# Patient Record
Sex: FEMALE | Race: WHITE | NOT HISPANIC OR LATINO | Employment: UNEMPLOYED | URBAN - METROPOLITAN AREA
[De-identification: names, ages, dates, MRNs, and addresses within clinical notes are randomized per-mention and may not be internally consistent; named-entity substitution may affect disease eponyms.]

---

## 2017-02-07 ENCOUNTER — HOSPITAL ENCOUNTER (OUTPATIENT)
Dept: LAB | Facility: MEDICAL CENTER | Age: 67
End: 2017-02-07
Attending: NURSE PRACTITIONER
Payer: COMMERCIAL

## 2017-02-07 LAB
BASOPHILS # BLD AUTO: 0.04 K/UL (ref 0–0.12)
BASOPHILS NFR BLD AUTO: 0.8 % (ref 0–1.8)
EOSINOPHIL # BLD: 0.17 K/UL (ref 0–0.51)
EOSINOPHIL NFR BLD AUTO: 3.3 % (ref 0–6.9)
ERYTHROCYTE [DISTWIDTH] IN BLOOD BY AUTOMATED COUNT: 47.8 FL (ref 35.9–50)
HCT VFR BLD AUTO: 40.6 % (ref 37–47)
HGB BLD-MCNC: 13.2 G/DL (ref 12–16)
IMM GRANULOCYTES # BLD AUTO: 0.01 K/UL (ref 0–0.11)
IMM GRANULOCYTES NFR BLD AUTO: 0.2 % (ref 0–0.9)
LYMPHOCYTES # BLD: 2.1 K/UL (ref 1–4.8)
LYMPHOCYTES NFR BLD AUTO: 40.5 % (ref 22–41)
MCH RBC QN AUTO: 31.1 PG (ref 27–33)
MCHC RBC AUTO-ENTMCNC: 32.5 G/DL (ref 33.6–35)
MCV RBC AUTO: 95.5 FL (ref 81.4–97.8)
MONOCYTES # BLD: 0.5 K/UL (ref 0–0.85)
MONOCYTES NFR BLD AUTO: 9.7 % (ref 0–13.4)
NEUTROPHILS # BLD: 2.36 K/UL (ref 2–7.15)
NEUTROPHILS NFR BLD AUTO: 45.5 % (ref 44–72)
NRBC # BLD AUTO: 0 K/UL
NRBC BLD-RTO: 0 /100 WBC
PLATELET # BLD AUTO: 399 K/UL (ref 164–446)
PMV BLD AUTO: 9.4 FL (ref 9–12.9)
RBC # BLD AUTO: 4.25 M/UL (ref 4.2–5.4)
WBC # BLD AUTO: 5.2 K/UL (ref 4.8–10.8)

## 2017-02-07 PROCEDURE — 85025 COMPLETE CBC W/AUTO DIFF WBC: CPT

## 2017-02-07 PROCEDURE — 36415 COLL VENOUS BLD VENIPUNCTURE: CPT

## 2017-02-08 ENCOUNTER — HOSPITAL ENCOUNTER (OUTPATIENT)
Facility: MEDICAL CENTER | Age: 67
End: 2017-02-08
Attending: NURSE PRACTITIONER
Payer: COMMERCIAL

## 2017-02-08 PROCEDURE — 82272 OCCULT BLD FECES 1-3 TESTS: CPT

## 2017-02-08 PROCEDURE — 89055 LEUKOCYTE ASSESSMENT FECAL: CPT

## 2017-02-08 PROCEDURE — 87328 CRYPTOSPORIDIUM AG IA: CPT

## 2017-02-08 PROCEDURE — 87493 C DIFF AMPLIFIED PROBE: CPT

## 2017-02-08 PROCEDURE — 87046 STOOL CULTR AEROBIC BACT EA: CPT

## 2017-02-08 PROCEDURE — 87329 GIARDIA AG IA: CPT

## 2017-02-08 PROCEDURE — 87045 FECES CULTURE AEROBIC BACT: CPT

## 2017-02-09 ENCOUNTER — HOSPITAL ENCOUNTER (OUTPATIENT)
Dept: LAB | Facility: MEDICAL CENTER | Age: 67
End: 2017-02-09
Attending: NURSE PRACTITIONER
Payer: COMMERCIAL

## 2017-02-09 LAB
25(OH)D3 SERPL-MCNC: 61 NG/ML (ref 30–100)
ALBUMIN SERPL BCP-MCNC: 4.2 G/DL (ref 3.2–4.9)
ALBUMIN/GLOB SERPL: 1.4 G/DL
ALP SERPL-CCNC: 92 U/L (ref 30–99)
ALT SERPL-CCNC: 13 U/L (ref 2–50)
ANION GAP SERPL CALC-SCNC: 7 MMOL/L (ref 0–11.9)
AST SERPL-CCNC: 19 U/L (ref 12–45)
BASOPHILS # BLD AUTO: 0.04 K/UL (ref 0–0.12)
BASOPHILS NFR BLD AUTO: 1 % (ref 0–1.8)
BILIRUB SERPL-MCNC: 0.6 MG/DL (ref 0.1–1.5)
BUN SERPL-MCNC: 25 MG/DL (ref 8–22)
C DIFF DNA SPEC QL NAA+PROBE: NEGATIVE
C DIFF TOX GENS STL QL NAA+PROBE: NEGATIVE
CALCIUM SERPL-MCNC: 10 MG/DL (ref 8.5–10.5)
CANCER AG125 SERPL-ACNC: 18.2 U/ML (ref 0–35)
CHLORIDE SERPL-SCNC: 104 MMOL/L (ref 96–112)
CHOLEST SERPL-MCNC: 245 MG/DL (ref 100–199)
CO2 SERPL-SCNC: 26 MMOL/L (ref 20–33)
CREAT SERPL-MCNC: 1.07 MG/DL (ref 0.5–1.4)
CRP SERPL HS-MCNC: 1.8 MG/L (ref 0–7.5)
EOSINOPHIL # BLD: 0.2 K/UL (ref 0–0.51)
EOSINOPHIL NFR BLD AUTO: 4.8 % (ref 0–6.9)
ERYTHROCYTE [DISTWIDTH] IN BLOOD BY AUTOMATED COUNT: 47.8 FL (ref 35.9–50)
ERYTHROCYTE [SEDIMENTATION RATE] IN BLOOD BY WESTERGREN METHOD: 14 MM/HOUR (ref 0–30)
FOLATE SERPL-MCNC: >23.6 NG/ML
GLOBULIN SER CALC-MCNC: 3 G/DL (ref 1.9–3.5)
GLUCOSE SERPL-MCNC: 87 MG/DL (ref 65–99)
HCT VFR BLD AUTO: 44 % (ref 37–47)
HDLC SERPL-MCNC: 62 MG/DL
HEMOCCULT STL QL: NEGATIVE
HGB BLD-MCNC: 13.6 G/DL (ref 12–16)
IMM GRANULOCYTES # BLD AUTO: 0.01 K/UL (ref 0–0.11)
IMM GRANULOCYTES NFR BLD AUTO: 0.2 % (ref 0–0.9)
LDLC SERPL CALC-MCNC: 169 MG/DL
LYMPHOCYTES # BLD: 1.42 K/UL (ref 1–4.8)
LYMPHOCYTES NFR BLD AUTO: 34.1 % (ref 22–41)
MCH RBC QN AUTO: 29.8 PG (ref 27–33)
MCHC RBC AUTO-ENTMCNC: 30.9 G/DL (ref 33.6–35)
MCV RBC AUTO: 96.3 FL (ref 81.4–97.8)
MONOCYTES # BLD: 0.47 K/UL (ref 0–0.85)
MONOCYTES NFR BLD AUTO: 11.3 % (ref 0–13.4)
NEUTROPHILS # BLD: 2.02 K/UL (ref 2–7.15)
NEUTROPHILS NFR BLD AUTO: 48.6 % (ref 44–72)
NRBC # BLD AUTO: 0 K/UL
NRBC BLD-RTO: 0 /100 WBC
PLATELET # BLD AUTO: 408 K/UL (ref 164–446)
PMV BLD AUTO: 9.8 FL (ref 9–12.9)
POTASSIUM SERPL-SCNC: 4.4 MMOL/L (ref 3.6–5.5)
PROT SERPL-MCNC: 7.2 G/DL (ref 6–8.2)
RBC # BLD AUTO: 4.57 M/UL (ref 4.2–5.4)
RHEUMATOID FACT SERPL-ACNC: <10 IU/ML (ref 0–14)
SODIUM SERPL-SCNC: 137 MMOL/L (ref 135–145)
T3FREE SERPL-MCNC: 3.95 PG/ML (ref 2.4–4.2)
T4 FREE SERPL-MCNC: 1.15 NG/DL (ref 0.53–1.43)
TRIGL SERPL-MCNC: 70 MG/DL (ref 0–149)
TSH SERPL DL<=0.005 MIU/L-ACNC: 8.07 UIU/ML (ref 0.3–3.7)
URATE SERPL-MCNC: 4.4 MG/DL (ref 1.9–8.2)
VIT B12 SERPL-MCNC: 1190 PG/ML (ref 211–911)
WBC # BLD AUTO: 4.2 K/UL (ref 4.8–10.8)
WBC STL QL MICRO: NORMAL

## 2017-02-09 PROCEDURE — 86431 RHEUMATOID FACTOR QUANT: CPT

## 2017-02-09 PROCEDURE — 86141 C-REACTIVE PROTEIN HS: CPT

## 2017-02-09 PROCEDURE — 85025 COMPLETE CBC W/AUTO DIFF WBC: CPT

## 2017-02-09 PROCEDURE — 82784 ASSAY IGA/IGD/IGG/IGM EACH: CPT | Mod: 91

## 2017-02-09 PROCEDURE — 82785 ASSAY OF IGE: CPT

## 2017-02-09 PROCEDURE — 82306 VITAMIN D 25 HYDROXY: CPT

## 2017-02-09 PROCEDURE — 84443 ASSAY THYROID STIM HORMONE: CPT

## 2017-02-09 PROCEDURE — 86039 ANTINUCLEAR ANTIBODIES (ANA): CPT

## 2017-02-09 PROCEDURE — 84550 ASSAY OF BLOOD/URIC ACID: CPT

## 2017-02-09 PROCEDURE — 36415 COLL VENOUS BLD VENIPUNCTURE: CPT

## 2017-02-09 PROCEDURE — 82746 ASSAY OF FOLIC ACID SERUM: CPT

## 2017-02-09 PROCEDURE — 86038 ANTINUCLEAR ANTIBODIES: CPT

## 2017-02-09 PROCEDURE — 82784 ASSAY IGA/IGD/IGG/IGM EACH: CPT

## 2017-02-09 PROCEDURE — 80053 COMPREHEN METABOLIC PANEL: CPT

## 2017-02-09 PROCEDURE — 80061 LIPID PANEL: CPT

## 2017-02-09 PROCEDURE — 85652 RBC SED RATE AUTOMATED: CPT

## 2017-02-09 PROCEDURE — 84481 FREE ASSAY (FT-3): CPT

## 2017-02-09 PROCEDURE — 82607 VITAMIN B-12: CPT

## 2017-02-09 PROCEDURE — 84439 ASSAY OF FREE THYROXINE: CPT

## 2017-02-09 PROCEDURE — 86304 IMMUNOASSAY TUMOR CA 125: CPT

## 2017-02-10 LAB
G LAMBLIA+C PARVUM AG STL QL RAPID: NORMAL
IGA SERPL-MCNC: 235 MG/DL (ref 68–408)
IGG SERPL-MCNC: 943 MG/DL (ref 768–1632)
IGM SERPL-MCNC: 60 MG/DL (ref 35–263)
SIGNIFICANT IND 70042: NORMAL
SITE SITE: NORMAL
SOURCE SOURCE: NORMAL

## 2017-02-11 LAB
NUCLEAR IGG SER QL IA: DETECTED
NUCLEAR IGG TITR SER IF: ABNORMAL {TITER}

## 2017-02-12 LAB
BACTERIA STL CULT: NORMAL
SIGNIFICANT IND 70042: NORMAL
SITE SITE: NORMAL
SOURCE SOURCE: NORMAL

## 2017-02-13 LAB — IGE SERPL-ACNC: 24 KU/L

## 2017-04-20 ENCOUNTER — HOSPITAL ENCOUNTER (OUTPATIENT)
Dept: LAB | Facility: MEDICAL CENTER | Age: 67
End: 2017-04-20
Attending: NURSE PRACTITIONER
Payer: COMMERCIAL

## 2017-04-20 LAB
T3FREE SERPL-MCNC: 3.44 PG/ML (ref 2.4–4.2)
T4 FREE SERPL-MCNC: 1.27 NG/DL (ref 0.53–1.43)
TSH SERPL DL<=0.005 MIU/L-ACNC: 1.46 UIU/ML (ref 0.3–3.7)

## 2017-04-20 PROCEDURE — 84439 ASSAY OF FREE THYROXINE: CPT

## 2017-04-20 PROCEDURE — 84443 ASSAY THYROID STIM HORMONE: CPT

## 2017-04-20 PROCEDURE — 36415 COLL VENOUS BLD VENIPUNCTURE: CPT

## 2017-04-20 PROCEDURE — 84481 FREE ASSAY (FT-3): CPT

## 2017-05-22 ENCOUNTER — HOSPITAL ENCOUNTER (OUTPATIENT)
Dept: RADIOLOGY | Facility: MEDICAL CENTER | Age: 67
End: 2017-05-22
Attending: OBSTETRICS & GYNECOLOGY
Payer: COMMERCIAL

## 2017-05-22 DIAGNOSIS — Z13.9 SCREENING: ICD-10-CM

## 2017-05-22 DIAGNOSIS — R92.30 DENSE BREAST TISSUE: ICD-10-CM

## 2017-05-22 PROCEDURE — 76641 ULTRASOUND BREAST COMPLETE: CPT

## 2017-05-22 PROCEDURE — 77063 BREAST TOMOSYNTHESIS BI: CPT

## 2017-05-23 ENCOUNTER — HOSPITAL ENCOUNTER (OUTPATIENT)
Dept: RADIOLOGY | Facility: MEDICAL CENTER | Age: 67
End: 2017-05-23
Attending: OBSTETRICS & GYNECOLOGY
Payer: COMMERCIAL

## 2017-05-23 DIAGNOSIS — Z80.41 FAMILY HISTORY OF MALIGNANT NEOPLASM OF OVARY: ICD-10-CM

## 2017-05-23 PROCEDURE — 76830 TRANSVAGINAL US NON-OB: CPT

## 2017-06-15 ENCOUNTER — HOSPITAL ENCOUNTER (OUTPATIENT)
Dept: RADIOLOGY | Facility: MEDICAL CENTER | Age: 67
End: 2017-06-15
Attending: OBSTETRICS & GYNECOLOGY
Payer: COMMERCIAL

## 2017-06-15 DIAGNOSIS — R92.8 ABNORMAL MAMMOGRAM OF LEFT BREAST: ICD-10-CM

## 2017-06-15 PROCEDURE — 76642 ULTRASOUND BREAST LIMITED: CPT | Mod: LT

## 2017-06-15 PROCEDURE — G0279 TOMOSYNTHESIS, MAMMO: HCPCS | Mod: LT

## 2017-06-21 ENCOUNTER — TELEPHONE (OUTPATIENT)
Dept: RADIOLOGY | Facility: MEDICAL CENTER | Age: 67
End: 2017-06-21

## 2017-06-21 NOTE — TELEPHONE ENCOUNTER
Spoke w/ pt to adwoa hoffman on 6/22 @ 10:30 check in @ 10:00, reviewed prep, location, and answered all questions

## 2017-06-22 ENCOUNTER — HOSPITAL ENCOUNTER (OUTPATIENT)
Dept: RADIOLOGY | Facility: MEDICAL CENTER | Age: 67
End: 2017-06-22
Attending: OBSTETRICS & GYNECOLOGY
Payer: COMMERCIAL

## 2017-06-22 DIAGNOSIS — R92.8 ABNORMAL MAMMOGRAM OF LEFT BREAST: ICD-10-CM

## 2017-06-22 PROCEDURE — A4648 IMPLANTABLE TISSUE MARKER: HCPCS

## 2017-06-22 PROCEDURE — 88305 TISSUE EXAM BY PATHOLOGIST: CPT

## 2017-06-23 ENCOUNTER — TELEPHONE (OUTPATIENT)
Dept: RADIOLOGY | Facility: MEDICAL CENTER | Age: 67
End: 2017-06-23

## 2017-07-26 ENCOUNTER — HOSPITAL ENCOUNTER (OUTPATIENT)
Dept: RADIOLOGY | Facility: MEDICAL CENTER | Age: 67
End: 2017-07-26
Attending: PHYSICIAN ASSISTANT
Payer: COMMERCIAL

## 2017-07-26 DIAGNOSIS — R22.32 MASS OF FINGER OF LEFT HAND: ICD-10-CM

## 2017-07-26 PROCEDURE — 73218 MRI UPPER EXTREMITY W/O DYE: CPT | Mod: LT

## 2017-12-20 DIAGNOSIS — Z01.812 PRE-OPERATIVE LABORATORY EXAMINATION: ICD-10-CM

## 2017-12-20 DIAGNOSIS — Z01.811 PRE-OPERATIVE RESPIRATORY EXAMINATION: ICD-10-CM

## 2017-12-20 DIAGNOSIS — Z01.810 PRE-OPERATIVE CARDIOVASCULAR EXAMINATION: ICD-10-CM

## 2017-12-20 LAB
ANION GAP SERPL CALC-SCNC: 6 MMOL/L (ref 0–11.9)
BUN SERPL-MCNC: 26 MG/DL (ref 8–22)
CALCIUM SERPL-MCNC: 9.3 MG/DL (ref 8.5–10.5)
CHLORIDE SERPL-SCNC: 106 MMOL/L (ref 96–112)
CO2 SERPL-SCNC: 27 MMOL/L (ref 20–33)
CREAT SERPL-MCNC: 1.08 MG/DL (ref 0.5–1.4)
ERYTHROCYTE [DISTWIDTH] IN BLOOD BY AUTOMATED COUNT: 46.8 FL (ref 35.9–50)
GFR SERPL CREATININE-BSD FRML MDRD: 51 ML/MIN/1.73 M 2
GLUCOSE SERPL-MCNC: 92 MG/DL (ref 65–99)
HCT VFR BLD AUTO: 39.1 % (ref 37–47)
HGB BLD-MCNC: 12.8 G/DL (ref 12–16)
MCH RBC QN AUTO: 31.3 PG (ref 27–33)
MCHC RBC AUTO-ENTMCNC: 32.7 G/DL (ref 33.6–35)
MCV RBC AUTO: 95.6 FL (ref 81.4–97.8)
PLATELET # BLD AUTO: 335 K/UL (ref 164–446)
PMV BLD AUTO: 9.6 FL (ref 9–12.9)
POTASSIUM SERPL-SCNC: 4.4 MMOL/L (ref 3.6–5.5)
RBC # BLD AUTO: 4.09 M/UL (ref 4.2–5.4)
SODIUM SERPL-SCNC: 139 MMOL/L (ref 135–145)
WBC # BLD AUTO: 5 K/UL (ref 4.8–10.8)

## 2017-12-20 PROCEDURE — 85027 COMPLETE CBC AUTOMATED: CPT

## 2017-12-20 PROCEDURE — 93005 ELECTROCARDIOGRAM TRACING: CPT

## 2017-12-20 PROCEDURE — 93010 ELECTROCARDIOGRAM REPORT: CPT | Performed by: INTERNAL MEDICINE

## 2017-12-20 PROCEDURE — 36415 COLL VENOUS BLD VENIPUNCTURE: CPT

## 2017-12-20 PROCEDURE — 80048 BASIC METABOLIC PNL TOTAL CA: CPT

## 2017-12-20 RX ORDER — COVID-19 ANTIGEN TEST
440 KIT MISCELLANEOUS DAILY
COMMUNITY
End: 2019-04-12

## 2017-12-20 RX ORDER — LEVOTHYROXINE SODIUM 0.15 MG/1
150 TABLET ORAL
COMMUNITY
End: 2018-05-10

## 2017-12-21 LAB — EKG IMPRESSION: NORMAL

## 2017-12-22 ENCOUNTER — OFFICE VISIT (OUTPATIENT)
Dept: INTERNAL MEDICINE | Facility: MEDICAL CENTER | Age: 67
End: 2017-12-22
Payer: COMMERCIAL

## 2017-12-22 VITALS
HEIGHT: 67 IN | TEMPERATURE: 97.7 F | SYSTOLIC BLOOD PRESSURE: 110 MMHG | WEIGHT: 193 LBS | BODY MASS INDEX: 30.29 KG/M2 | DIASTOLIC BLOOD PRESSURE: 64 MMHG | OXYGEN SATURATION: 96 % | HEART RATE: 73 BPM

## 2017-12-22 DIAGNOSIS — F41.9 ANXIETY: ICD-10-CM

## 2017-12-22 DIAGNOSIS — R05.8 DRY COUGH: ICD-10-CM

## 2017-12-22 DIAGNOSIS — Z23 NEED FOR VACCINATION FOR STREP PNEUMONIAE: ICD-10-CM

## 2017-12-22 DIAGNOSIS — E03.9 HYPOTHYROIDISM, UNSPECIFIED TYPE: ICD-10-CM

## 2017-12-22 DIAGNOSIS — Z23 NEED FOR INFLUENZA VACCINATION: ICD-10-CM

## 2017-12-22 DIAGNOSIS — K21.9 GASTROESOPHAGEAL REFLUX DISEASE, ESOPHAGITIS PRESENCE NOT SPECIFIED: ICD-10-CM

## 2017-12-22 DIAGNOSIS — Z00.00 HEALTHCARE MAINTENANCE: ICD-10-CM

## 2017-12-22 DIAGNOSIS — F32.A DEPRESSION, UNSPECIFIED DEPRESSION TYPE: ICD-10-CM

## 2017-12-22 DIAGNOSIS — Z86.39 HISTORY OF HYPERLIPIDEMIA: ICD-10-CM

## 2017-12-22 DIAGNOSIS — J34.89 STUFFY AND RUNNY NOSE: ICD-10-CM

## 2017-12-22 PROCEDURE — 90662 IIV NO PRSV INCREASED AG IM: CPT | Performed by: INTERNAL MEDICINE

## 2017-12-22 PROCEDURE — 90471 IMMUNIZATION ADMIN: CPT | Performed by: INTERNAL MEDICINE

## 2017-12-22 PROCEDURE — 99204 OFFICE O/P NEW MOD 45 MIN: CPT | Mod: 25,GC | Performed by: INTERNAL MEDICINE

## 2017-12-22 PROCEDURE — 90732 PPSV23 VACC 2 YRS+ SUBQ/IM: CPT | Performed by: INTERNAL MEDICINE

## 2017-12-22 PROCEDURE — 90472 IMMUNIZATION ADMIN EACH ADD: CPT | Performed by: INTERNAL MEDICINE

## 2017-12-22 RX ORDER — CETIRIZINE HYDROCHLORIDE 10 MG/1
10 TABLET ORAL DAILY
Qty: 30 TAB | Refills: 3 | Status: SHIPPED | OUTPATIENT
Start: 2017-12-22 | End: 2018-12-27

## 2017-12-22 RX ORDER — THIAMINE HCL 100 MG
TABLET ORAL
COMMUNITY

## 2017-12-22 RX ORDER — BENZONATATE 100 MG/1
100 CAPSULE ORAL 3 TIMES DAILY PRN
Qty: 60 CAP | Refills: 0 | Status: ON HOLD | OUTPATIENT
Start: 2017-12-22 | End: 2017-12-29

## 2017-12-22 ASSESSMENT — PATIENT HEALTH QUESTIONNAIRE - PHQ9: CLINICAL INTERPRETATION OF PHQ2 SCORE: 0

## 2017-12-22 NOTE — PROGRESS NOTES
"Lang Estrella is a 67 y.o. female here for a non-provider visit for:   FLU    Reason for immunization: Annual Flu Vaccine  Immunization records indicate need for vaccine: Yes, confirmed with Epic  Minimum interval has been met for this vaccine: Yes  ABN completed: Not Indicated    Order and dose verified by: ION AGUILAR Dated  8/7/15 was given to patient: Yes  All IAC Questionnaire questions were answered \"No.\"    Patient tolerated injection and no adverse effects were observed or reported: Yes    Pt scheduled for next dose in series: Not Indicated    Lang Estrella is a 67 y.o. female here for a non-provider visit for:   PNEUMOVAX (PPSV23)    Reason for immunization: Overdue/Provider Recommended  Immunization records indicate need for vaccine: Yes, confirmed with Epic  Minimum interval has been met for this vaccine: Yes  ABN completed: Not Indicated    Order and dose verified by: ION AGUILAR Dated  4/24/15 was given to patient: Yes  All IAC Questionnaire questions were answered \"No.\"    Patient tolerated injection and no adverse effects were observed or reported: Yes    Pt scheduled for next dose in series: Not Indicated    "

## 2017-12-22 NOTE — PROGRESS NOTES
New Patient to Establish    Reason to establish: New patient to establish    CC: establish care, stuffy runny nose, dry cough, blood clots in nose    HPI:   68 y/o female with past medical history of depression, anxiety, GERD, xerophthalmia,  and hypothyroidism presents to establish care and complains of stuffy and runny nose and dry cough.   Patient has recent history of sinusitis for which she completed 2 courses of antibiotics for 20 days complete by November 22, augmentin followed by amoxicillin.   Patient complains of intermittient blood and blood clots mixed with nasal mucous, for the past 2 weeks. She denies having active nose bleeds or prolonged nasal bleeding.  She gives history of having sinus pressure and achy sensatioin along b/l temples and frontal sinus area. She also gets frequent headaches in occipital + frontal area severity of 5-7/10. She has been taking aleve and tylenol for the headache and it has been helping. She does have a h/o of TMJ and uses nightguards.   She also complains of dry cough for the past 4-6 weeks, since completion of her antibiotics.    She denies sore throat, fever, chills, but does have night sweats infrequently.     Per patient her right eye has been bothering her and has been red on and off. However, she has xerophthalmia for which uses Restasis.    She has history of seasonal allergies that are present during fall, winter, and spring.   She is currently not taking any medication for allergies.      Depression:  On sertraline for about 5 years  PHQ2 negative, mood is fine. She denies suicidal or homicidal ideation.     Anxiety:   she denies h/o panic attacks. She has been doing really well on her current medication, sertraline.     GERD-  Diagnosed about 5 years ago. EGD done 2 years ago showing no esophagitis or PUD. On PPI and ranitidine for a couple years.     Healthcare Maintenance discussion:  Cancer screening:  Colonoscopy was done 2 years ago, and patient told to  repeat in 10 years.   Personal history of breast cancer s/p Right partial mastectomy in 2001 with radiation. B/L breast Reconsctruction in 2009.   Mammogram done last summer showed something suspicisou in Left breast followed by needle biopsy which showed mainly scar tissue. Since then patient needs to go in every 6 months for mammogram.   Pap smear- last year, and everything was okay. Has been getting pap smears yearly for many years now.     Vaccination:  Flu vaccine- has not gotten one this year. Flu vaccine provided.  tdap- about 1-2 years ago  prevnar - done last year, pneumovax (23) provided.     Metabolic:  Patient is very physically active. She goes to the gym a couple times a week and walks around campus quite a bit. She teaches at [employer].   Eats healthy food with oatmeal and yogurt for breakfast with half a bagel. Lunch is sandwich or burrito. Dinner with salad and some type of meat. She does have a bit of sweet tooth.   Patient has history of hyperlipidemia, but did not tolerate statins due to calf and leg pain/aches.     Social:  PHq2 is negative. Patient is on sertraline for depression and anxiety.     Infectious:  She used to give blood frequently in the past. She is not interested in getting tested for HIV.   She would like to be tested for Hepatitis C.       Patient Active Problem List    Diagnosis Date Noted   • Depression 12/22/2017   • Anxiety 12/22/2017   • GERD (gastroesophageal reflux disease) 12/22/2017   • Hypothyroidism 12/22/2017   • History of hyperlipidemia 12/22/2017   • Acquired deflected nasal septum 01/15/2016   • Hypertrophy of both inferior nasal turbinates 01/15/2016       Past Medical History:   Diagnosis Date   • Arthritis     haNDS   • Breast cancer (CMS-HCC)    • Bronchitis    • Cancer (CMS-HCC) 2000    breast   • Cold 11/2017    sinus infection- on augmentin   • Disorder of thyroid    • Heart burn    • Hiatus hernia syndrome    • Indigestion    • Pneumonia    •  Psychiatric problem    • Snoring    • Urinary incontinence        Current Outpatient Prescriptions   Medication Sig Dispense Refill   • Magnesium 500 MG Tab Take  by mouth.     • cetirizine (ZYRTEC) 10 MG Tab Take 1 Tab by mouth every day. 30 Tab 3   • benzonatate (TESSALON) 100 MG Cap Take 1 Cap by mouth 3 times a day as needed for Cough. 60 Cap 0   • levothyroxine (SYNTHROID) 150 MCG Tab Take 150 mcg by mouth Every morning on an empty stomach.     • Naproxen Sodium (ALEVE) 220 MG Cap Take 440 mg by mouth every day.     • RANITIDINE HCL PO Take  by mouth as needed.     • liothyronine (CYTOMEL) 5 MCG Tab Take 10 mcg by mouth every day.     • sertraline (ZOLOFT) 25 MG tablet Take 25 mg by mouth every day.     • valacyclovir (VALTREX) 1 GM Tab Take 1,000 mg by mouth as needed (rash).     • Dexlansoprazole 60 MG CAPSULE DELAYED RELEASE delayed-release capsule Take 60 mg by mouth every day.     • Thiamine HCl (VITAMIN B-1 PO) Take 1 Tab by mouth every day.     • VITAMIN D, CHOLECALCIFEROL, PO Take 1 Tab by mouth every day.     • Misc Natural Products (JOINT SUPPORT PO) Take 2 Tabs by mouth 2 Times a Day.     • Calcium-Magnesium-Vitamin D (CALCIUM 500 PO) Take 1 Tab by mouth 2 Times a Day.     • VITAMIN E PO Take 1 Tab by mouth every day.     • aspirin (ASA) 81 MG Chew Tab chewable tablet Take 81 mg by mouth. Every other day       No current facility-administered medications for this visit.        Allergies as of 12/22/2017 - Reviewed 12/22/2017   Allergen Reaction Noted   • Statins [hmg-coa-r inhibitors]  12/22/2017       Social History     Social History   • Marital status:      Spouse name: N/A   • Number of children: N/A   • Years of education: N/A     Occupational History   • Not on file.     Social History Main Topics   • Smoking status: Never Smoker   • Smokeless tobacco: Never Used   • Alcohol use 3.0 oz/week     5 Glasses of wine per week      Comment: 5/week   • Drug use: No   • Sexual activity: Not on  "file     Other Topics Concern   • Not on file     Social History Narrative   • No narrative on file       Family History   Problem Relation Age of Onset   • Breast Cancer Mother    • Stroke Mother    • Cancer Father      brain- most likely mets   • Heart Disease Father    • Cancer Sister 55     ovarian cancer - BRCA checked for her and was negative   • Diabetes Brother    • Diabetes Maternal Grandfather    • Cancer Sister 59     brain and breast cancer       Past Surgical History:   Procedure Laterality Date   • SEPTOPLASTY N/A 1/15/2016    Procedure: SEPTOPLASTY;  Surgeon: Randolph Kinney M.D.;  Location: [facility];  Service:    • TURBINOPLASTY Bilateral 1/15/2016    Procedure: TURBINOPLASTY;  Surgeon: Randolph Kinney M.D.;  Location: [facility];  Service:    • MASTECTOMY  2001   • LUMPECTOMY     • OTHER      Breast cancer and breast implants   • OTHER      sinus surgery   • OTHER ORTHOPEDIC SURGERY      bunions alethea   • PB RADIATION THERAPY PLAN SIMPLE         ROS: As per HPI. Additional pertinent symptoms as noted below.    All others negative    /64   Pulse 73   Temp 36.5 °C (97.7 °F)   Ht 1.702 m (5' 7\")   Wt 87.5 kg (193 lb)   SpO2 96%   BMI 30.23 kg/m²     Physical Exam  General:  Alert and oriented, No apparent distress.    Eyes: Pupils equal and reactive. No scleral icterus.    Throat: Clear no erythema or exudates noted.    Neck: Supple. No lymphadenopathy noted. Thyroid not enlarged.    Lungs: Clear to auscultation and percussion bilaterally.    Cardiovascular: Regular rate and rhythm. No murmurs, rubs or gallops.    Abdomen:  Benign. No rebound or guarding noted.    Extremities: No clubbing, cyanosis, edema.    Skin: Clear. No rash or suspicious skin lesions noted.      Note: I have reviewed all pertinent labs and diagnostic tests associated with this visit with specific comments listed under the assessment and plan below    Assessment and " Plan    1. Stuffy and runny nose  Stuffy and runny nose with blood clots mixed with nasal mucous for the past 2 weeks. Associated with sinus pressure and achy sensatioin along b/l temples and frontal sinus area and frequent headaches. Recently completed 2 courses of antibiotics by the end of November for sinusitis.   She has h/o seasonal allergies.    No tenderness to palpation of sinus areas. Nose and throat within normal limits.     Unlikely sinusitis. Most likely environmental allergies.  Cetirizine ordered. Recommended use of humidifier, nasal moisturizing agents like alkalol and possible usage of nasal vaseline for increased moisture.    2. Dry cough  Patient also complains of dry cough for the past 4-6 weeks, since completion of her antibiotics.  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     Cough most likely related to PND.  Tessalon ordered.     3. Depression, unspecified depression type  Patient is stable on sertraline.  PHQ2 negative and she denies suicidal or homicidal ideation.     4. Anxiety  Patient denies h/o panic attack. Anxiety well controlled on sertraline.    5. Gastroesophageal reflux disease, esophagitis presence not specified  Symptoms controlled with use of dexlansoprazole and ranitidine.    6. Need for influenza vaccination  Flu vaccine provided.    7. Hypothyroidism, unspecified type  Controlled on synthroid and liothyronine.     TSH + free T4 + T3 ordered.    8. History of hyperlipidemia  Lipid panel ordered.    9. Healthcare maintenance  Please see HPI for detailed discussion.  Hepatitis C testing ordered. CMP ordered.    10. Need for vaccination for Strep pneumoniae  Pneumovax (23) provided.       Followup: Return in about 3 months (around 3/22/2018).    Risk Assessment (discuss potential complications a function of chronic problems): x    Complexity (discuss number of co-morbidities): x    Signed by: Doyle Phan M.D.

## 2017-12-22 NOTE — PATIENT INSTRUCTIONS
Please use room humidifier and keep nose moist. May use cotton swab with vaseline on it as needed.

## 2017-12-23 PROBLEM — Z80.3 FAMILY HISTORY OF BREAST CANCER: Status: ACTIVE | Noted: 2017-12-23

## 2017-12-23 PROBLEM — B02.9 HERPES ZOSTER WITHOUT COMPLICATION: Status: ACTIVE | Noted: 2017-12-23

## 2017-12-23 PROBLEM — Z85.3 PERSONAL HISTORY OF BREAST CANCER: Status: ACTIVE | Noted: 2017-12-23

## 2017-12-26 ENCOUNTER — APPOINTMENT (OUTPATIENT)
Dept: RADIOLOGY | Facility: MEDICAL CENTER | Age: 67
End: 2017-12-26
Attending: OBSTETRICS & GYNECOLOGY
Payer: COMMERCIAL

## 2017-12-27 ENCOUNTER — HOSPITAL ENCOUNTER (OUTPATIENT)
Dept: LAB | Facility: MEDICAL CENTER | Age: 67
End: 2017-12-27
Attending: STUDENT IN AN ORGANIZED HEALTH CARE EDUCATION/TRAINING PROGRAM
Payer: COMMERCIAL

## 2017-12-27 DIAGNOSIS — F41.9 ANXIETY: ICD-10-CM

## 2017-12-27 DIAGNOSIS — E03.9 HYPOTHYROIDISM, UNSPECIFIED TYPE: ICD-10-CM

## 2017-12-27 LAB
ALBUMIN SERPL BCP-MCNC: 4 G/DL (ref 3.2–4.9)
ALBUMIN/GLOB SERPL: 1.4 G/DL
ALP SERPL-CCNC: 85 U/L (ref 30–99)
ALT SERPL-CCNC: 19 U/L (ref 2–50)
ANION GAP SERPL CALC-SCNC: 7 MMOL/L (ref 0–11.9)
AST SERPL-CCNC: 22 U/L (ref 12–45)
BILIRUB SERPL-MCNC: 0.5 MG/DL (ref 0.1–1.5)
BUN SERPL-MCNC: 24 MG/DL (ref 8–22)
CALCIUM SERPL-MCNC: 9.8 MG/DL (ref 8.5–10.5)
CHLORIDE SERPL-SCNC: 107 MMOL/L (ref 96–112)
CHOLEST SERPL-MCNC: 231 MG/DL (ref 100–199)
CO2 SERPL-SCNC: 27 MMOL/L (ref 20–33)
CREAT SERPL-MCNC: 0.98 MG/DL (ref 0.5–1.4)
GFR SERPL CREATININE-BSD FRML MDRD: 57 ML/MIN/1.73 M 2
GLOBULIN SER CALC-MCNC: 2.9 G/DL (ref 1.9–3.5)
GLUCOSE SERPL-MCNC: 97 MG/DL (ref 65–99)
HCV AB SER QL: NEGATIVE
HDLC SERPL-MCNC: 61 MG/DL
LDLC SERPL CALC-MCNC: 151 MG/DL
POTASSIUM SERPL-SCNC: 4.4 MMOL/L (ref 3.6–5.5)
PROT SERPL-MCNC: 6.9 G/DL (ref 6–8.2)
SODIUM SERPL-SCNC: 141 MMOL/L (ref 135–145)
T3 SERPL-MCNC: 118.7 NG/DL (ref 60–181)
T4 FREE SERPL-MCNC: 1.29 NG/DL (ref 0.53–1.43)
TRIGL SERPL-MCNC: 95 MG/DL (ref 0–149)
TSH SERPL DL<=0.005 MIU/L-ACNC: 0.23 UIU/ML (ref 0.38–5.33)

## 2017-12-27 PROCEDURE — 86803 HEPATITIS C AB TEST: CPT

## 2017-12-27 PROCEDURE — 80053 COMPREHEN METABOLIC PANEL: CPT

## 2017-12-27 PROCEDURE — 84480 ASSAY TRIIODOTHYRONINE (T3): CPT

## 2017-12-27 PROCEDURE — 80061 LIPID PANEL: CPT

## 2017-12-27 PROCEDURE — 36415 COLL VENOUS BLD VENIPUNCTURE: CPT

## 2017-12-27 PROCEDURE — 84443 ASSAY THYROID STIM HORMONE: CPT

## 2017-12-27 PROCEDURE — 84439 ASSAY OF FREE THYROXINE: CPT

## 2017-12-29 ENCOUNTER — HOSPITAL ENCOUNTER (OUTPATIENT)
Facility: MEDICAL CENTER | Age: 67
End: 2017-12-29
Attending: OBSTETRICS & GYNECOLOGY | Admitting: OBSTETRICS & GYNECOLOGY
Payer: COMMERCIAL

## 2017-12-29 VITALS
HEART RATE: 80 BPM | DIASTOLIC BLOOD PRESSURE: 67 MMHG | WEIGHT: 187.39 LBS | TEMPERATURE: 97.8 F | SYSTOLIC BLOOD PRESSURE: 137 MMHG | HEIGHT: 67 IN | OXYGEN SATURATION: 92 % | RESPIRATION RATE: 18 BRPM | BODY MASS INDEX: 29.41 KG/M2

## 2017-12-29 PROCEDURE — 700111 HCHG RX REV CODE 636 W/ 250 OVERRIDE (IP)

## 2017-12-29 PROCEDURE — 160041 HCHG SURGERY MINUTES - EA ADDL 1 MIN LEVEL 4: Performed by: OBSTETRICS & GYNECOLOGY

## 2017-12-29 PROCEDURE — A4338 INDWELLING CATHETER LATEX: HCPCS | Performed by: OBSTETRICS & GYNECOLOGY

## 2017-12-29 PROCEDURE — 700102 HCHG RX REV CODE 250 W/ 637 OVERRIDE(OP)

## 2017-12-29 PROCEDURE — 500868 HCHG NEEDLE, SURGI(VARES): Performed by: OBSTETRICS & GYNECOLOGY

## 2017-12-29 PROCEDURE — 501838 HCHG SUTURE GENERAL: Performed by: OBSTETRICS & GYNECOLOGY

## 2017-12-29 PROCEDURE — 160029 HCHG SURGERY MINUTES - 1ST 30 MINS LEVEL 4: Performed by: OBSTETRICS & GYNECOLOGY

## 2017-12-29 PROCEDURE — 160002 HCHG RECOVERY MINUTES (STAT): Performed by: OBSTETRICS & GYNECOLOGY

## 2017-12-29 PROCEDURE — A6402 STERILE GAUZE <= 16 SQ IN: HCPCS | Performed by: OBSTETRICS & GYNECOLOGY

## 2017-12-29 PROCEDURE — 88305 TISSUE EXAM BY PATHOLOGIST: CPT

## 2017-12-29 PROCEDURE — 501582 HCHG TROCAR, THRD BLADED: Performed by: OBSTETRICS & GYNECOLOGY

## 2017-12-29 PROCEDURE — 500886 HCHG PACK, LAPAROSCOPY: Performed by: OBSTETRICS & GYNECOLOGY

## 2017-12-29 PROCEDURE — 160035 HCHG PACU - 1ST 60 MINS PHASE I: Performed by: OBSTETRICS & GYNECOLOGY

## 2017-12-29 PROCEDURE — A9270 NON-COVERED ITEM OR SERVICE: HCPCS

## 2017-12-29 PROCEDURE — 700101 HCHG RX REV CODE 250

## 2017-12-29 PROCEDURE — 501586 HCHG TROCAR, THRD SPIKE 5X55: Performed by: OBSTETRICS & GYNECOLOGY

## 2017-12-29 PROCEDURE — 160009 HCHG ANES TIME/MIN: Performed by: OBSTETRICS & GYNECOLOGY

## 2017-12-29 PROCEDURE — 160048 HCHG OR STATISTICAL LEVEL 1-5: Performed by: OBSTETRICS & GYNECOLOGY

## 2017-12-29 PROCEDURE — 160036 HCHG PACU - EA ADDL 30 MINS PHASE I: Performed by: OBSTETRICS & GYNECOLOGY

## 2017-12-29 RX ORDER — EPINEPHRINE 1 MG/ML
INJECTION INTRAMUSCULAR; INTRAVENOUS; SUBCUTANEOUS
Status: DISCONTINUED
Start: 2017-12-29 | End: 2017-12-29 | Stop reason: HOSPADM

## 2017-12-29 RX ORDER — ACETAMINOPHEN 500 MG
TABLET ORAL
Status: COMPLETED
Start: 2017-12-29 | End: 2017-12-29

## 2017-12-29 RX ORDER — OXYCODONE HYDROCHLORIDE 5 MG/1
5 TABLET ORAL
Status: DISCONTINUED | OUTPATIENT
Start: 2017-12-29 | End: 2017-12-29 | Stop reason: HOSPADM

## 2017-12-29 RX ORDER — SODIUM CHLORIDE, SODIUM LACTATE, POTASSIUM CHLORIDE, CALCIUM CHLORIDE 600; 310; 30; 20 MG/100ML; MG/100ML; MG/100ML; MG/100ML
INJECTION, SOLUTION INTRAVENOUS CONTINUOUS
Status: DISCONTINUED | OUTPATIENT
Start: 2017-12-29 | End: 2017-12-29 | Stop reason: HOSPADM

## 2017-12-29 RX ORDER — IBUPROFEN 400 MG/1
800 TABLET ORAL
Status: DISCONTINUED | OUTPATIENT
Start: 2017-12-29 | End: 2017-12-29 | Stop reason: HOSPADM

## 2017-12-29 RX ORDER — GABAPENTIN 300 MG/1
CAPSULE ORAL
Status: COMPLETED
Start: 2017-12-29 | End: 2017-12-29

## 2017-12-29 RX ORDER — OXYCODONE HYDROCHLORIDE 5 MG/1
10 TABLET ORAL
Status: DISCONTINUED | OUTPATIENT
Start: 2017-12-29 | End: 2017-12-29 | Stop reason: HOSPADM

## 2017-12-29 RX ORDER — OXYCODONE HCL 10 MG/1
TABLET, FILM COATED, EXTENDED RELEASE ORAL
Status: COMPLETED
Start: 2017-12-29 | End: 2017-12-29

## 2017-12-29 RX ORDER — OXYCODONE HCL 5 MG/5 ML
SOLUTION, ORAL ORAL
Status: COMPLETED
Start: 2017-12-29 | End: 2017-12-29

## 2017-12-29 RX ORDER — BUPIVACAINE HYDROCHLORIDE AND EPINEPHRINE 2.5; 5 MG/ML; UG/ML
INJECTION, SOLUTION EPIDURAL; INFILTRATION; INTRACAUDAL; PERINEURAL
Status: DISCONTINUED | OUTPATIENT
Start: 2017-12-29 | End: 2017-12-29 | Stop reason: HOSPADM

## 2017-12-29 RX ORDER — BUPIVACAINE HYDROCHLORIDE 2.5 MG/ML
INJECTION, SOLUTION EPIDURAL; INFILTRATION; INTRACAUDAL
Status: DISCONTINUED
Start: 2017-12-29 | End: 2017-12-29 | Stop reason: HOSPADM

## 2017-12-29 RX ORDER — ACETAMINOPHEN 500 MG
1000 TABLET ORAL EVERY 6 HOURS
Status: DISCONTINUED | OUTPATIENT
Start: 2017-12-29 | End: 2017-12-29 | Stop reason: HOSPADM

## 2017-12-29 RX ORDER — ONDANSETRON 2 MG/ML
4 INJECTION INTRAMUSCULAR; INTRAVENOUS EVERY 6 HOURS PRN
Status: DISCONTINUED | OUTPATIENT
Start: 2017-12-29 | End: 2017-12-29 | Stop reason: HOSPADM

## 2017-12-29 RX ADMIN — OXYCODONE HYDROCHLORIDE 10 MG: 10 TABLET, FILM COATED, EXTENDED RELEASE ORAL at 07:15

## 2017-12-29 RX ADMIN — ACETAMINOPHEN 500 MG: 500 TABLET, FILM COATED ORAL at 07:15

## 2017-12-29 RX ADMIN — FENTANYL CITRATE 25 MCG: 50 INJECTION, SOLUTION INTRAMUSCULAR; INTRAVENOUS at 08:45

## 2017-12-29 RX ADMIN — FENTANYL CITRATE 25 MCG: 50 INJECTION, SOLUTION INTRAMUSCULAR; INTRAVENOUS at 09:22

## 2017-12-29 RX ADMIN — SODIUM CHLORIDE, SODIUM LACTATE, POTASSIUM CHLORIDE, CALCIUM CHLORIDE 1000 ML: 600; 310; 30; 20 INJECTION, SOLUTION INTRAVENOUS at 06:30

## 2017-12-29 RX ADMIN — OXYCODONE HYDROCHLORIDE 10 MG: 5 SOLUTION ORAL at 08:57

## 2017-12-29 RX ADMIN — GABAPENTIN 300 MG: 300 CAPSULE ORAL at 07:15

## 2017-12-29 ASSESSMENT — PAIN SCALES - GENERAL
PAINLEVEL_OUTOF10: ASSUMED PAIN PRESENT
PAINLEVEL_OUTOF10: 3
PAINLEVEL_OUTOF10: ASSUMED PAIN PRESENT
PAINLEVEL_OUTOF10: 7
PAINLEVEL_OUTOF10: 3
PAINLEVEL_OUTOF10: 6
PAINLEVEL_OUTOF10: 6
PAINLEVEL_OUTOF10: 3
PAINLEVEL_OUTOF10: 3

## 2017-12-29 NOTE — OR SURGEON
Immediate Post OP Note    PreOp Diagnosis: Family history of ovarian cancer, personal history of breast cancer    PostOp Diagnosis: same    Procedure(s):  PELVISCOPY - Wound Class: Clean  LAPAROSCOPIC BILATERAL SALPINGO OOPHERECTOMY - Wound Class: Clean    Surgeon(s):  DENTON Kapadia M.D.    Anesthesiologist/Type of Anesthesia:  Anesthesiologist: Kylah Bustillos M.D./General    Surgical Staff:  Circulator: Dena Powell R.N.  Scrub Person: Mars Leiva; Pina Cast    Specimens:  * Bilateral ovaries and tubes*    Estimated Blood Loss: minimal    Findings: Normal pelvis except adhesion of right ovary to bowel    Complications: none        12/29/2017 8:50 AM Haydee River

## 2017-12-29 NOTE — DISCHARGE INSTRUCTIONS
ACTIVITY: Rest and take it easy for the first 24 hours.  A responsible adult is recommended to remain with you during that time.  It is normal to feel sleepy.  We encourage you to not do anything that requires balance, judgment or coordination.    MILD FLU-LIKE SYMPTOMS ARE NORMAL. YOU MAY EXPERIENCE GENERALIZED MUSCLE ACHES, THROAT IRRITATION, HEADACHE AND/OR SOME NAUSEA.    FOR 24 HOURS DO NOT:  Drive, operate machinery or run household appliances.  Drink beer or alcoholic beverages.   Make important decisions or sign legal documents.    SPECIAL INSTRUCTIONS: PER MD'S INSTRUCTIONS  Follow up two weeks. Pelvic rest x 2 weeks    DIET: To avoid nausea, slowly advance diet as tolerated, avoiding spicy or greasy foods for the first day.  Add more substantial food to your diet according to your physician's instructions.  Babies can be fed formula or breast milk as soon as they are hungry.  INCREASE FLUIDS AND FIBER TO AVOID CONSTIPATION.    SURGICAL DRESSING/BATHING: PER MD'S INSTRUCTIONS    FOLLOW-UP APPOINTMENT:  A follow-up appointment should be arranged with your doctor in PER MD; call to schedule.    You should CALL YOUR PHYSICIAN if you develop:  Fever greater than 101 degrees F.  Pain not relieved by medication, or persistent nausea or vomiting.  Excessive bleeding (blood soaking through dressing) or unexpected drainage from the wound.  Extreme redness or swelling around the incision site, drainage of pus or foul smelling drainage.  Inability to urinate or empty your bladder within 8 hours.  Problems with breathing or chest pain.    You should call 911 if you develop problems with breathing or chest pain.  If you are unable to contact your doctor or surgical center, you should go to the nearest emergency room or urgent care center.  Physician's telephone #: [phone]    If any questions arise, call your doctor.  If your doctor is not available, please feel free to call the Surgical Center at [phone].  The  Center is open Monday through Friday from 7AM to 7PM.  You can also call the HEALTH HOTLINE open 24 hours/day, 7 days/week and speak to a nurse at [phone], or toll free at [phone].    A registered nurse may call you a few days after your surgery to see how you are doing after your procedure.    MEDICATIONS: Resume taking daily medication.  Take prescribed pain medication with food.  If no medication is prescribed, you may take non-aspirin pain medication if needed.  PAIN MEDICATION CAN BE VERY CONSTIPATING.  Take a stool softener or laxative such as senokot, pericolace, or milk of magnesia if needed.    Prescription given for NA.  Last pain medication given at 0900 next dose due at 1 pm today    If your physician has prescribed pain medication that includes Acetaminophen (Tylenol), do not take additional Acetaminophen (Tylenol) while taking the prescribed medication.    Depression / Suicide Risk    As you are discharged from this [facility] facility, it is important to learn how to keep safe from harming yourself.    Recognize the warning signs:  · Abrupt changes in personality, positive or negative- including increase in energy   · Giving away possessions  · Change in eating patterns- significant weight changes-  positive or negative  · Change in sleeping patterns- unable to sleep or sleeping all the time   · Unwillingness or inability to communicate  · Depression  · Unusual sadness, discouragement and loneliness  · Talk of wanting to die  · Neglect of personal appearance   · Rebelliousness- reckless behavior  · Withdrawal from people/activities they love  · Confusion- inability to concentrate     If you or a loved one observes any of these behaviors or has concerns about self-harm, here's what you can do:  · Talk about it- your feelings and reasons for harming yourself  · Remove any means that you might use to hurt yourself (examples: pills, rope, extension cords, firearm)  · Get professional help  from the community (Mental Health, Substance Abuse, psychological counseling)  · Do not be alone:Call your Safe Contact- someone whom you trust who will be there for you.  · Call your local CRISIS HOTLINE 797-1509 or 542-840-8498  · Call your local Children's Mobile Crisis Response Team [place] (102) 406-0591 or www.[employer]  · Call the toll free National Suicide Prevention Hotlines   · National Suicide Prevention Lifeline 704-542-TQMF (0993)  · National Hope Line Network 800-SUICIDE (113-6274)

## 2017-12-29 NOTE — OR NURSING
0839 Pt received in PACU , accompanied by RN and Anesthesia - report received and care assumed.  - monitors on - VSS - breathing even and unlabored. dsgs  - x2  covered with dsd and tegaderm, all MAREN - peripad on   C/O abd pain - see MAR  0857 - C/O 7/10 PAIN - see MAR  0922 Medicated with iv fentanyl for c/o lower abdominal pain 6/10  1010- pt to bathroom - voiding without problems- no c/o nausea - pain3/10  1040 discharge instructions reviewed with pt and family - all questions and concerns addressed  1053 pt d/tracey to private car via w/c acc by family and volunteer

## 2017-12-29 NOTE — OP REPORT
DATE OF SERVICE:  12/29/2017    SURGEON:  Haydee River MD.    ANESTHESIOLOGIST:  Kylha Bustillos MD.    PREOPERATIVE DIAGNOSIS:  Family history of ovarian cancer and personal history   of breast cancer.    POSTOPERATIVE DIAGNOSIS:  Family history of ovarian cancer and personal   history of breast cancer.    PROCEDURE:  Laparoscopic bilateral salpingo-oophorectomy.    FINDINGS:  On examination under anesthesia, the pelvis was normal.  On   laparoscopy, the pelvis was also normal.  There was evidence of interruption   of the tubes from previous tubal ligation.  The ovaries were somewhat   attenuated and on the right side there were adhesions of the ovary to the   bowel that were taken down bluntly.  The liver edge was normal to brief   inspection.    PROCEDURE TECHNIQUE:  After induction of general anesthesia with endotracheal   intubation, the patient was placed in dorsal lithotomy position and examined   under anesthesia.  She was then prepped and draped in the usual sterile   fashion and a Molina catheter was placed.  A single-tooth tenaculum was   attached to the anterior lip of the cervix and a Zimmerman cannula was placed in   the cervix and attached to the tenaculum because the Hulka was too long for   the cavity.  The surgeon's gloves were changed and attention was directed   abdominally.    With the legs in low lithotomy position, the umbilicus was injected with 0.25%   Marcaine with epinephrine and a 1 cm vertical incision was made.  A Veress   needle was inserted into the peritoneal cavity and placement was tested with   aspiration of saline.  The abdomen was then insufflated with 3 L of   carbon-dioxide gas and an 11 mm trocar was placed.  An operating laparoscope   was used to visualize intraabdominal contents and the pelvis was inspected.  A   5 mm suprapubic port was placed after injection of Marcaine with epinephrine   and a grasper was used through the lower sleeve and a LigaSure tripolar    electrocautery through the operating laparoscope.  The patient's right ovary   was grasped with the grasper and elevated.  It was adherent with filmy   adhesions to the bowel and these were taken down bluntly with the LigaSure   without difficulty, isolating the infundibulopelvic ligament.  The ureter was   noted to run a normal course below the area of operation and noted to   peristalsis normally.  The IP ligament was doubly cauterized with the LigaSure   and divided.  This dissection was carried across the broad ligament and the   tube was transected near the uterus.  During this process, the distal tube was   left attached to the peritoneum and the pelvis.  The right ovary was removed   through the umbilical port.  The left distal tube was then removed with the   LigaSure and also removed through the umbilical port.  A small amount of   bleeding was controlled across the incision line with the LigaSure.    Attention was directed to the left side.  The left distal fallopian tube was   elevated and no adhesions were noted on this side.  The infundibulopelvic   ligament was doubly cauterized with the LigaSure and the ovary and tube were   removed intact from the proximal uterine connections.  The specimen was   brought out through the umbilical port as well.  A small amount of bleeding   was controlled with the LigaSure.  The pelvis was irrigated and inspected and   hemostasis was noted to be good.  All the instruments were removed.  The   umbilical incision was closed with a stitch of 0 Vicryl for the fascia and   interrupted subcuticular 4-0 Vicryl for the skin of both incisions.  The   vaginal instruments were removed and hemostasis was noted to be good.  Sponge,   needle and instrument counts were correct at the end of the procedure.  The   patient was awakened and taken in stable condition to the recovery room.    ESTIMATED BLOOD LOSS:  Minimal.    FLUIDS:  Crystalloid.    SPECIMENS:  Bilateral ovaries and  tubes to pathology.    DRAINS:  The Molina was removed in the operating room.    RECOMMENDATIONS AND DISPOSITION:  The patient will be discharged to home when   tolerating p.o. and urinating.  She is to follow up in 2 weeks and maintain   pelvic rest for 2 weeks.  She will be taking Percocet and Zofran as needed.       ____________________________________     MD THOMAS HOLMAN / YULI    DD:  12/29/2017 08:43:27  DT:  12/29/2017 09:23:53    D#:  [id number]  Job#:  [id number]    cc: _____ _____

## 2018-01-18 ENCOUNTER — TELEPHONE (OUTPATIENT)
Dept: RADIOLOGY | Facility: MEDICAL CENTER | Age: 68
End: 2018-01-18

## 2018-01-19 ENCOUNTER — HOSPITAL ENCOUNTER (OUTPATIENT)
Dept: RADIOLOGY | Facility: MEDICAL CENTER | Age: 68
End: 2018-01-19
Attending: OBSTETRICS & GYNECOLOGY
Payer: COMMERCIAL

## 2018-01-19 DIAGNOSIS — R92.8 ABNORMAL FINDINGS ON DIAGNOSTIC IMAGING OF BREAST: ICD-10-CM

## 2018-01-19 PROCEDURE — 77065 DX MAMMO INCL CAD UNI: CPT | Mod: LT

## 2018-01-19 PROCEDURE — 76642 ULTRASOUND BREAST LIMITED: CPT | Mod: LT

## 2018-04-16 ENCOUNTER — OFFICE VISIT (OUTPATIENT)
Dept: INTERNAL MEDICINE | Facility: MEDICAL CENTER | Age: 68
End: 2018-04-16
Payer: COMMERCIAL

## 2018-04-16 VITALS
BODY MASS INDEX: 30.29 KG/M2 | OXYGEN SATURATION: 94 % | WEIGHT: 193 LBS | TEMPERATURE: 98.1 F | DIASTOLIC BLOOD PRESSURE: 66 MMHG | HEART RATE: 70 BPM | SYSTOLIC BLOOD PRESSURE: 106 MMHG | HEIGHT: 67 IN

## 2018-04-16 DIAGNOSIS — Z79.899 HIGH RISK MEDICATION USE: ICD-10-CM

## 2018-04-16 DIAGNOSIS — E03.9 HYPOTHYROIDISM, UNSPECIFIED TYPE: ICD-10-CM

## 2018-04-16 DIAGNOSIS — G47.9 TROUBLE IN SLEEPING: Primary | ICD-10-CM

## 2018-04-16 DIAGNOSIS — Z98.82 HISTORY OF BREAST IMPLANT: ICD-10-CM

## 2018-04-16 PROCEDURE — 99214 OFFICE O/P EST MOD 30 MIN: CPT | Mod: GC | Performed by: INTERNAL MEDICINE

## 2018-04-16 RX ORDER — ZOLPIDEM TARTRATE 10 MG/1
10 TABLET ORAL NIGHTLY PRN
Qty: 90 TAB | Refills: 0 | Status: SHIPPED | OUTPATIENT
Start: 2018-04-16 | End: 2018-04-16

## 2018-04-16 RX ORDER — RANITIDINE 300 MG/1
TABLET ORAL
COMMUNITY
Start: 2018-02-24 | End: 2020-06-08

## 2018-04-16 RX ORDER — OXYBUTYNIN CHLORIDE 5 MG/1
TABLET ORAL
COMMUNITY
Start: 2018-02-24 | End: 2018-04-16 | Stop reason: SDUPTHER

## 2018-04-16 RX ORDER — OXYBUTYNIN CHLORIDE 5 MG/1
5 TABLET ORAL 3 TIMES DAILY
Qty: 90 TAB | Refills: 2 | Status: SHIPPED | OUTPATIENT
Start: 2018-04-16 | End: 2021-08-26

## 2018-04-16 RX ORDER — ZOLPIDEM TARTRATE 10 MG/1
10 TABLET ORAL NIGHTLY PRN
COMMUNITY
End: 2018-04-16 | Stop reason: SDUPTHER

## 2018-04-16 RX ORDER — ZOLPIDEM TARTRATE 10 MG/1
10 TABLET ORAL NIGHTLY PRN
Qty: 30 TAB | Refills: 0 | Status: SHIPPED
Start: 2018-04-16 | End: 2018-05-16

## 2018-04-16 RX ORDER — DONEPEZIL HYDROCHLORIDE 5 MG/1
TABLET, FILM COATED ORAL
COMMUNITY
Start: 2018-02-10 | End: 2018-08-22 | Stop reason: SDUPTHER

## 2018-04-16 NOTE — PROGRESS NOTES
Established Patient    Lang presents today with the following:    CC: Follow up for hypothyroidism and hyperlipidemia. Trouble sleeping.    HPI: Patient is a 67-year-old female with history of depression and anxiety, GERD, hypothyroidism, and hyperlipidemia. Patient is here to follow up for labs that were done back in December as well as to address her trouble sleeping.    Hypothyroidism  Patient with a history of hypothyroidism currently being treated with levothyroxine and Cytomel, currently asymptomatic. Labs show mildly suppressed TSH and normal T3 and T4. Patient denies changes in weight, constipation or diarrhea, palpitations, changes in vision, hair loss, brittle nails, and chest pain.    Hyperlipidemia  Labs showed that the patient's LDL is elevated at 151. Her CVD risk score is 4.3% and does not require medical intervention at this time. Patient is also allergic to statins from which she gets severe muscle cramps. Patient has a strong family history of heart disease, but she herself does not have CAD.    Trouble sleeping  Patient reports that she travels at least several times per year with her  to various locations and that when she travels she always has trouble sleeping and a new bed. Patient reports that she usually takes Ambien 10 mg at night whenever she is traveling and that helps her get a restful night's sleep. Does not generally take Ambien when she is at home. Has no personal or family history of drug or alcohol abuse. ORT score is 1.    Patient Active Problem List    Diagnosis Date Noted   • Personal history of breast cancer 12/23/2017   • Family history of breast cancer 12/23/2017   • Herpes zoster without complication 12/23/2017   • Depression 12/22/2017   • Anxiety 12/22/2017   • GERD (gastroesophageal reflux disease) 12/22/2017   • Hypothyroidism 12/22/2017   • History of hyperlipidemia 12/22/2017       Current Outpatient Prescriptions   Medication Sig Dispense Refill   •  "oxybutynin (DITROPAN) 5 MG Tab Take 1 Tab by mouth 3 times a day. 90 Tab 2   • zolpidem (AMBIEN) 10 MG Tab Take 1 Tab by mouth at bedtime as needed for Sleep for up to 30 days. 30 Tab 0   • Magnesium 500 MG Tab Take  by mouth.     • levothyroxine (SYNTHROID) 150 MCG Tab Take 150 mcg by mouth Every morning on an empty stomach.     • liothyronine (CYTOMEL) 5 MCG Tab Take 10 mcg by mouth every day.     • sertraline (ZOLOFT) 25 MG tablet Take 50 mg by mouth every day.     • Dexlansoprazole 60 MG CAPSULE DELAYED RELEASE delayed-release capsule Take 60 mg by mouth every day.     • Thiamine HCl (VITAMIN B-1 PO) Take 1 Tab by mouth every day.     • VITAMIN D, CHOLECALCIFEROL, PO Take 1 Tab by mouth every day.     • VITAMIN E PO Take 1 Tab by mouth every day.     • Misc Natural Products (JOINT SUPPORT PO) Take 2 Tabs by mouth 2 Times a Day.     • Calcium-Magnesium-Vitamin D (CALCIUM 500 PO) Take 1 Tab by mouth 2 Times a Day.     • aspirin (ASA) 81 MG Chew Tab chewable tablet Take 81 mg by mouth. Every other day     • ranitidine (ZANTAC) 300 MG tablet      • donepezil (ARICEPT) 5 MG Tab      • cetirizine (ZYRTEC) 10 MG Tab Take 1 Tab by mouth every day. 30 Tab 3   • Naproxen Sodium (ALEVE) 220 MG Cap Take 440 mg by mouth every day.     • valacyclovir (VALTREX) 1 GM Tab Take 1,000 mg by mouth as needed (rash).       No current facility-administered medications for this visit.        ROS: As per HPI. Additional pertinent symptoms as noted below.    All others negative    /66   Pulse 70   Temp 36.7 °C (98.1 °F)   Ht 1.702 m (5' 7\")   Wt 87.5 kg (193 lb)   SpO2 94%   BMI 30.23 kg/m²     Physical Exam   Constitutional:  oriented to person, place, and time. No distress.   Eyes: Pupils are equal, round, and reactive to light. No scleral icterus.  Neck: Neck supple. No thyromegaly present.   Cardiovascular: Normal rate, regular rhythm and normal heart sounds.  Exam reveals no gallop and no friction rub.  No murmur " heard.  Pulmonary/Chest: Breath sounds normal. Chest wall is not dull to percussion.   Musculoskeletal:   no edema.   Lymphadenopathy: no cervical adenopathy  Neurological: alert and oriented to person, place, and time.   Skin: No cyanosis. Nails show no clubbing.  Other: Soft mobile palpable lump in left breast in the median upper quadrant that feels like an implant. Nontender to palpation, nonerythematous, not inflamed.    Note: I have reviewed all pertinent labs and diagnostic tests associated with this visit with specific comments listed under the assessment and plan below    Assessment and Plan    1. Hypothyroidism, unspecified type  Patient with mildly suppressed TSH on labs. Currently asymptomatic.  - Repeat TSH, T4, T3  - If her TSH is still suppressed on repeat labs, we'll make adjustments to her Synthroid.    2. Trouble in sleeping  3. High risk medication use  Patient with trouble sleeping while traveling. Usually gets about 90 tablets of Ambien to last her an entire year. ORT score is 1.  without red flags.  - Treatment contract signed  - Ambien 10 mg 30 tablets when necessary sent to pharmacy.    4. History of breast implant  On palpation patient has a palpable lump in her left breast that feels like part of the implant.  - Referral to plastic surgery for further evaluation      Followup: Return in about 6 months (around 10/16/2018).      Signed by: Janessa Swanson M.D.

## 2018-04-25 LAB
T3 SERPL-MCNC: 138 NG/DL (ref 71–180)
T4 FREE SERPL-MCNC: 2.24 NG/DL (ref 0.82–1.77)
T4 SERPL-MCNC: 9.8 UG/DL (ref 4.5–12)
TSH SERPL DL<=0.005 MIU/L-ACNC: 0.19 UIU/ML (ref 0.45–4.5)

## 2018-05-03 NOTE — PROGRESS NOTES
Please call patient to let her know her thyroid labs were abnormal and we need to adjust her medications as described in the [employer] note affiliated with her thyroid lab results. Please see if she checked her [employer] message and ask her to schedule a clinic visit.

## 2018-05-10 ENCOUNTER — OFFICE VISIT (OUTPATIENT)
Dept: INTERNAL MEDICINE | Facility: MEDICAL CENTER | Age: 68
End: 2018-05-10
Payer: COMMERCIAL

## 2018-05-10 VITALS
HEART RATE: 78 BPM | DIASTOLIC BLOOD PRESSURE: 74 MMHG | HEIGHT: 67 IN | RESPIRATION RATE: 19 BRPM | WEIGHT: 193.8 LBS | SYSTOLIC BLOOD PRESSURE: 120 MMHG | BODY MASS INDEX: 30.42 KG/M2 | TEMPERATURE: 98 F | OXYGEN SATURATION: 95 %

## 2018-05-10 DIAGNOSIS — E78.5 DYSLIPIDEMIA: ICD-10-CM

## 2018-05-10 DIAGNOSIS — E03.9 HYPOTHYROIDISM, UNSPECIFIED TYPE: ICD-10-CM

## 2018-05-10 PROCEDURE — 99214 OFFICE O/P EST MOD 30 MIN: CPT | Mod: GE | Performed by: INTERNAL MEDICINE

## 2018-05-10 RX ORDER — LEVOTHYROXINE SODIUM 137 UG/1
137 TABLET ORAL
Qty: 30 TAB | Refills: 3 | Status: SHIPPED | OUTPATIENT
Start: 2018-05-10 | End: 2018-05-10 | Stop reason: SDUPTHER

## 2018-05-10 RX ORDER — LEVOTHYROXINE SODIUM 137 UG/1
137 TABLET ORAL
Qty: 90 TAB | Refills: 2 | Status: SHIPPED | OUTPATIENT
Start: 2018-05-10 | End: 2018-05-20

## 2018-05-10 RX ORDER — ATORVASTATIN CALCIUM 10 MG/1
10 TABLET, FILM COATED ORAL DAILY
Qty: 30 TAB | Refills: 0 | Status: SHIPPED | OUTPATIENT
Start: 2018-05-10 | End: 2018-08-16

## 2018-05-10 NOTE — PROGRESS NOTES
Established Patient    Lang presents today with the following:    CC: Hypothyroidism, dyslipidemia    HPI:   66 y/o female with past medical history of depression, anxiety, GERD, xerophthalmia,  and hypothyroidism presents to follow-up on hypothyroidism and abnormal lipid panel.    Per patient, after reviewing her labs about 2 weeks ago she self adjusted her medication from 150 µg of Synthroid to 137 µg. She had pills of 137 µg left over which she used. Patient had loose stools and felt jittery prior to the previous labs, but figured her symptoms might be due to [place] from [place] bacterial contamination. Patient also takes cytomel 5mcg BID, which she has been on for many years.     Patient's lipid panel is abnormal with a calculated cardiac risk 5.1% based on today's blood pressure. Per patient, she gets muscle aches and body aches with use of cholesterol medication in the past. Patient has tried 2 different statins in the past.   Patient is quite active and regularly exercises 90 minutes, 3 times a week, split between bike and treadmill, 45 minutes each.    Patient has had breast concerns palpated breast lump, which patient feels appears after aggressive ultrasound breast. Patient will be seeing her plastic surgeon Dr. Bull tomorrow. Patient also has a repeat mammogram scheduled this month.    Patient Active Problem List    Diagnosis Date Noted   • Dyslipidemia 05/10/2018   • Personal history of breast cancer 12/23/2017   • Family history of breast cancer 12/23/2017   • Herpes zoster without complication 12/23/2017   • Depression 12/22/2017   • Anxiety 12/22/2017   • GERD (gastroesophageal reflux disease) 12/22/2017   • Hypothyroidism 12/22/2017   • History of hyperlipidemia 12/22/2017       Current Outpatient Prescriptions   Medication Sig Dispense Refill   • levothyroxine (SYNTHROID) 137 MCG Tab Take 1 Tab by mouth Every morning on an empty stomach. 90 Tab 2   •  "atorvastatin (LIPITOR) 10 MG Tab Take 1 Tab by mouth every day. 30 Tab 0   • ranitidine (ZANTAC) 300 MG tablet      • donepezil (ARICEPT) 5 MG Tab      • oxybutynin (DITROPAN) 5 MG Tab Take 1 Tab by mouth 3 times a day. 90 Tab 2   • zolpidem (AMBIEN) 10 MG Tab Take 1 Tab by mouth at bedtime as needed for Sleep for up to 30 days. 30 Tab 0   • Magnesium 500 MG Tab Take  by mouth.     • cetirizine (ZYRTEC) 10 MG Tab Take 1 Tab by mouth every day. 30 Tab 3   • Naproxen Sodium (ALEVE) 220 MG Cap Take 440 mg by mouth every day.     • sertraline (ZOLOFT) 25 MG tablet Take 50 mg by mouth every day.     • valacyclovir (VALTREX) 1 GM Tab Take 1,000 mg by mouth as needed (rash).     • Thiamine HCl (VITAMIN B-1 PO) Take 1 Tab by mouth every day.     • VITAMIN D, CHOLECALCIFEROL, PO Take 1 Tab by mouth every day.     • VITAMIN E PO Take 1 Tab by mouth every day.     • Misc Natural Products (JOINT SUPPORT PO) Take 2 Tabs by mouth 2 Times a Day.     • Calcium-Magnesium-Vitamin D (CALCIUM 500 PO) Take 1 Tab by mouth 2 Times a Day.     • aspirin (ASA) 81 MG Chew Tab chewable tablet Take 81 mg by mouth. Every other day     • Dexlansoprazole 60 MG CAPSULE DELAYED RELEASE delayed-release capsule Take 60 mg by mouth every day.       No current facility-administered medications for this visit.        ROS: As per HPI. Additional pertinent symptoms as noted below.    All others negative    /74   Pulse 78   Temp 36.7 °C (98 °F)   Resp 19   Ht 1.702 m (5' 7\")   Wt 87.9 kg (193 lb 12.8 oz)   SpO2 95%   Breastfeeding? No   BMI 30.35 kg/m²     Physical Exam   Constitutional:  oriented to person, place, and time. No distress.   Eyes: Pupils are equal, round, and reactive to light. No scleral icterus.  Neck: Neck supple. No thyromegaly present.   Cardiovascular: Normal rate, regular rhythm and normal heart sounds.  Exam reveals no gallop and no friction rub.  No murmur heard.  Pulmonary/Chest: Breath sounds normal. "   Musculoskeletal:   no edema.   Lymphadenopathy: no cervical adenopathy  Neurological: alert and oriented to person, place, and time.   Skin: No cyanosis. Nails show no clubbing.    Note: I have reviewed all pertinent labs and diagnostic tests associated with this visit with specific comments listed under the assessment and plan below    Assessment and Plan    1. Hypothyroidism, unspecified type  Patient has had TSH suppression on repeated labs, with most recent labs showing an elevated T4 as well. Patient self adjusted her medication and lowered her dose to 137 µg of Synthroid with continued use of her Cytomel 5 µg twice a day.  After adjusting her dose patient noticed an improvement in her symptoms and says she had felt jittery and had loose stools prior to that adjustment.     137 µg of Synthroid prescribed. Cytomel 5 µg discontinued. Repeat TSH and free T4 in about 8 weeks.     2. Dyslipidemia  Patient's lipid panel is abnormal with a calculated cardiac risk of 5.1% based on today's blood pressure. Per patient, she gets muscle aches and body aches with use of cholesterol medication in the past. Patient has tried 2 different statins in the past.     Patient is at moderate risk. Ordered NMR profile to be performed in about 4-6 months. After discussion with patient, atorvastatin 10 mg prescribed. Patient will begin this medication about 3 weeks after being on her newly adjusted thyroid regimen so as to not confound possible side effects of statin vs lower thyroid levels.   Patient advised to discontinue use of atorvastatin if she experiences similar side effects as before. If so, will consider ezetimibe after completion of NMR profile.       Followup: Return in about 8 weeks (around 7/5/2018).      Signed by: Doyle Phan M.D.

## 2018-05-18 ENCOUNTER — TELEPHONE (OUTPATIENT)
Dept: INTERNAL MEDICINE | Facility: MEDICAL CENTER | Age: 68
End: 2018-05-18

## 2018-05-18 NOTE — TELEPHONE ENCOUNTER
1. Caller Name: Lang Estrella                      Call Back Number: [phone] (home)     2. Message: Pt called stating that she cannot take generic Levothyroxine, please re-send rx for BRAND Synthroid and atiya PARTIDA.     3. Patient approves office to leave a detailed voicemail/MyChart message: yes

## 2018-05-20 DIAGNOSIS — E03.9 HYPOTHYROIDISM, UNSPECIFIED TYPE: ICD-10-CM

## 2018-05-20 RX ORDER — LEVOTHYROXINE SODIUM 137 MCG
137 TABLET ORAL
Qty: 30 TAB | Refills: 3 | Status: SHIPPED | OUTPATIENT
Start: 2018-05-20 | End: 2018-08-16

## 2018-08-08 ENCOUNTER — HOSPITAL ENCOUNTER (OUTPATIENT)
Dept: RADIOLOGY | Facility: MEDICAL CENTER | Age: 68
End: 2018-08-08
Attending: OBSTETRICS & GYNECOLOGY
Payer: COMMERCIAL

## 2018-08-08 ENCOUNTER — HOSPITAL ENCOUNTER (OUTPATIENT)
Dept: RADIOLOGY | Facility: MEDICAL CENTER | Age: 68
End: 2018-08-08
Attending: FAMILY MEDICINE
Payer: COMMERCIAL

## 2018-08-08 DIAGNOSIS — Z12.31 BREAST CANCER SCREENING BY MAMMOGRAM: ICD-10-CM

## 2018-08-08 DIAGNOSIS — N95.8 POSTARTIFICIAL MENOPAUSAL SYNDROME: ICD-10-CM

## 2018-08-08 PROCEDURE — 77080 DXA BONE DENSITY AXIAL: CPT

## 2018-08-15 LAB
CHOLEST SERPL-MCNC: 239 MG/DL (ref 100–199)
HDL SERPL QN: 8.8 NM
HDL SERPL-SCNC: 40.4 UMOL/L
HDLC SERPL-MCNC: 61 MG/DL
HLD.LARGE SERPL-SCNC: 5.7 UMOL/L
LDL SERPL QN: 20.9 NM
LDL SERPL QN: 20.9 NM
LDL SERPL-SCNC: 2360 NMOL/L
LDL SMALL SERPL-SCNC: 1119 NMOL/L
LDL SMALL SERPL-SCNC: 1119 NMOL/L
LDLC SERPL CALC-MCNC: 155 MG/DL (ref 0–99)
LP-IR SCORE SERPL: 33
T4 FREE SERPL-MCNC: 1.56 NG/DL (ref 0.82–1.77)
TRIGL SERPL-MCNC: 117 MG/DL (ref 0–149)
TSH SERPL DL<=0.005 MIU/L-ACNC: 8.33 UIU/ML (ref 0.45–4.5)
VLDL LARGE SERPL-SCNC: <0.8 NMOL/L
VLDL SERPL QN: 38.6 NM

## 2018-08-16 DIAGNOSIS — E03.9 HYPOTHYROIDISM, UNSPECIFIED TYPE: ICD-10-CM

## 2018-08-16 RX ORDER — ATORVASTATIN CALCIUM 20 MG/1
20 TABLET, FILM COATED ORAL DAILY
Qty: 30 TAB | Refills: 3 | Status: SHIPPED | OUTPATIENT
Start: 2018-08-16 | End: 2018-12-27

## 2018-08-16 RX ORDER — LEVOTHYROXINE SODIUM 0.15 MG/1
150 TABLET ORAL
Qty: 30 TAB | Refills: 3 | Status: SHIPPED | OUTPATIENT
Start: 2018-08-16 | End: 2018-08-22

## 2018-08-17 ENCOUNTER — HOSPITAL ENCOUNTER (OUTPATIENT)
Dept: RADIOLOGY | Facility: MEDICAL CENTER | Age: 68
End: 2018-08-17
Attending: OBSTETRICS & GYNECOLOGY
Payer: COMMERCIAL

## 2018-08-17 DIAGNOSIS — N64.4 BREAST PAIN: ICD-10-CM

## 2018-08-17 DIAGNOSIS — N63.20 LEFT BREAST LUMP: ICD-10-CM

## 2018-08-17 PROCEDURE — 76642 ULTRASOUND BREAST LIMITED: CPT | Mod: LT

## 2018-08-17 PROCEDURE — G0279 TOMOSYNTHESIS, MAMMO: HCPCS

## 2018-08-21 ENCOUNTER — TELEPHONE (OUTPATIENT)
Dept: INTERNAL MEDICINE | Facility: MEDICAL CENTER | Age: 68
End: 2018-08-21

## 2018-08-21 RX ORDER — LEVOTHYROXINE SODIUM 0.15 MG/1
150 TABLET ORAL
Qty: 90 TAB | Refills: 0 | Status: CANCELLED | OUTPATIENT
Start: 2018-08-21

## 2018-08-21 NOTE — TELEPHONE ENCOUNTER
1. Caller Name: Pt                      Call Back Number: [phone] (home)     2. Message: Patient called stating her meds have to be 90 days when they are sent to [facility]. Her recent script to [facility] for levothyroxine wasn't sent as 90 and she will get charged more. She also wanted Dr. Phan to know that she is no longer on the statin and will not need any new scripts sent. She     3. Patient approves office to leave a detailed voicemail/[employer] message: N\A

## 2018-08-22 RX ORDER — DONEPEZIL HYDROCHLORIDE 5 MG/1
5 TABLET, FILM COATED ORAL DAILY
Qty: 90 TAB | Refills: 0 | Status: SHIPPED | OUTPATIENT
Start: 2018-08-22 | End: 2018-11-12 | Stop reason: SDUPTHER

## 2018-08-22 RX ORDER — LEVOTHYROXINE SODIUM 150 MCG
150 TABLET ORAL
Qty: 90 TAB | Refills: 1 | Status: SHIPPED | OUTPATIENT
Start: 2018-08-22 | End: 2019-03-09 | Stop reason: SDUPTHER

## 2018-08-22 RX ORDER — CYCLOSPORINE 0.5 MG/ML
EMULSION OPHTHALMIC
COMMUNITY
Start: 2018-08-05 | End: 2021-08-26

## 2018-08-22 NOTE — TELEPHONE ENCOUNTER
Last seen: 05/10/18 by Dr. Phan  Next appt: None     Was the patient seen in the last year in this department? Yes   Does patient have an active prescription for medications requested? No   Received Request Via: Pharmacy

## 2018-08-27 RX ORDER — ATORVASTATIN CALCIUM 20 MG/1
20 TABLET, FILM COATED ORAL DAILY
Qty: 90 TAB | Refills: 0 | OUTPATIENT
Start: 2018-08-27

## 2018-08-27 RX ORDER — LEVOTHYROXINE SODIUM 150 MCG
150 TABLET ORAL
Qty: 90 TAB | Refills: 0 | OUTPATIENT
Start: 2018-08-27

## 2018-11-12 NOTE — TELEPHONE ENCOUNTER
Was the patient seen in the last year in this department? Yes  Last seen: 05/10/18 by Dr. Emilie Callahan appt: NONE    Does patient have an active prescription for medications requested? No     Received Request Via: Pharmacy

## 2018-11-13 RX ORDER — DONEPEZIL HYDROCHLORIDE 5 MG/1
5 TABLET, FILM COATED ORAL DAILY
Qty: 90 TAB | Refills: 1 | Status: SHIPPED | OUTPATIENT
Start: 2018-11-13 | End: 2019-11-19 | Stop reason: SDUPTHER

## 2018-12-27 ENCOUNTER — OFFICE VISIT (OUTPATIENT)
Dept: URGENT CARE | Facility: PHYSICIAN GROUP | Age: 68
End: 2018-12-27
Payer: COMMERCIAL

## 2018-12-27 VITALS
SYSTOLIC BLOOD PRESSURE: 106 MMHG | WEIGHT: 185 LBS | DIASTOLIC BLOOD PRESSURE: 60 MMHG | TEMPERATURE: 99.7 F | HEIGHT: 67 IN | OXYGEN SATURATION: 96 % | BODY MASS INDEX: 29.03 KG/M2 | HEART RATE: 72 BPM | RESPIRATION RATE: 16 BRPM

## 2018-12-27 DIAGNOSIS — J10.1 INFLUENZA A: Primary | ICD-10-CM

## 2018-12-27 DIAGNOSIS — J02.9 SORE THROAT: ICD-10-CM

## 2018-12-27 DIAGNOSIS — R05.9 COUGH: ICD-10-CM

## 2018-12-27 LAB
FLUAV+FLUBV AG SPEC QL IA: NORMAL
INT CON NEG: NEGATIVE
INT CON NEG: NEGATIVE
INT CON POS: POSITIVE
INT CON POS: POSITIVE
S PYO AG THROAT QL: NEGATIVE

## 2018-12-27 PROCEDURE — 87804 INFLUENZA ASSAY W/OPTIC: CPT | Performed by: PHYSICIAN ASSISTANT

## 2018-12-27 PROCEDURE — 99203 OFFICE O/P NEW LOW 30 MIN: CPT | Performed by: PHYSICIAN ASSISTANT

## 2018-12-27 PROCEDURE — 87880 STREP A ASSAY W/OPTIC: CPT | Performed by: PHYSICIAN ASSISTANT

## 2018-12-27 RX ORDER — METHYLPREDNISOLONE 4 MG/1
4 TABLET ORAL DAILY
Qty: 1 KIT | Refills: 0 | Status: SHIPPED | OUTPATIENT
Start: 2018-12-27 | End: 2019-01-02

## 2018-12-27 RX ORDER — BENZONATATE 100 MG/1
100 CAPSULE ORAL 3 TIMES DAILY PRN
Qty: 15 CAP | Refills: 0 | Status: SHIPPED | OUTPATIENT
Start: 2018-12-27 | End: 2018-12-27

## 2018-12-27 RX ORDER — OSELTAMIVIR PHOSPHATE 75 MG/1
75 CAPSULE ORAL 2 TIMES DAILY
Qty: 10 CAP | Refills: 0 | Status: SHIPPED | OUTPATIENT
Start: 2018-12-27 | End: 2019-01-01

## 2018-12-27 RX ORDER — BENZONATATE 100 MG/1
100 CAPSULE ORAL 3 TIMES DAILY PRN
Qty: 15 CAP | Refills: 0 | Status: SHIPPED | OUTPATIENT
Start: 2018-12-27 | End: 2019-01-01

## 2018-12-27 ASSESSMENT — ENCOUNTER SYMPTOMS
SORE THROAT: 1
HEADACHES: 1
RHINORRHEA: 1
WHEEZING: 1
FEVER: 1
CHILLS: 1
COUGH: 1
SHORTNESS OF BREATH: 1

## 2018-12-27 ASSESSMENT — COPD QUESTIONNAIRES: COPD: 0

## 2018-12-27 NOTE — PROGRESS NOTES
Subjective:   Lang Estrella is a 68 y.o. female who presents for Cough (congestion, headache, sinus pain x 10 days)        Cough   This is a new problem. Episode onset: 10 days. The problem has been unchanged. The cough is productive of sputum. Associated symptoms include chills, ear congestion, ear pain, a fever, headaches, nasal congestion, postnasal drip, rhinorrhea, a sore throat, shortness of breath and wheezing. Risk factors: Denies recent travel, hospitalization or surgeries. Nonsmoker. Treatments tried: musinex, humidifer. Her past medical history is significant for bronchitis, environmental allergies and pneumonia. There is no history of asthma or COPD.      Initial cough was resolving but noticed sudden onset of body aches and fever yesterday.     Review of Systems   Constitutional: Positive for chills and fever.   HENT: Positive for ear pain, postnasal drip, rhinorrhea and sore throat.    Respiratory: Positive for cough, shortness of breath and wheezing.    Neurological: Positive for headaches.   Endo/Heme/Allergies: Positive for environmental allergies.       PMH:  has a past medical history of Arthritis; Breast cancer (HCC); Bronchitis; Cancer ([facility]) (2000); Cold (11/2017); Disorder of thyroid; Heart burn; Hiatus hernia syndrome; Indigestion; Pneumonia; Psychiatric problem; Snoring; and Urinary incontinence.  MEDS:   Current Outpatient Prescriptions:   •  oseltamivir (TAMIFLU) 75 MG Cap, Take 1 Cap by mouth 2 times a day for 5 days., Disp: 10 Cap, Rfl: 0  •  benzonatate (TESSALON) 100 MG Cap, Take 1 Cap by mouth 3 times a day as needed for Cough for up to 5 days., Disp: 15 Cap, Rfl: 0  •  MethylPREDNISolone (MEDROL DOSEPAK) 4 MG Tablet Therapy Pack, Take 1 Tab by mouth every day for 6 days. Take as tapered-dosage schedule, Disp: 1 Kit, Rfl: 0  •  donepezil (ARICEPT) 5 MG Tab, Take 1 Tab by mouth every day., Disp: 90 Tab, Rfl: 1  •  traZODone (DESYREL) 50 MG Tab, Take 1 Tab by mouth at bedtime as  needed for Sleep., Disp: 90 Tab, Rfl: 1  •  SYNTHROID 150 MCG Tab, Take 1 Tab by mouth Every morning on an empty stomach., Disp: 90 Tab, Rfl: 1  •  RESTASIS 0.05 % ophthalmic emulsion, , Disp: , Rfl:   •  ranitidine (ZANTAC) 300 MG tablet, , Disp: , Rfl:   •  oxybutynin (DITROPAN) 5 MG Tab, Take 1 Tab by mouth 3 times a day., Disp: 90 Tab, Rfl: 2  •  Magnesium 500 MG Tab, Take  by mouth., Disp: , Rfl:   •  sertraline (ZOLOFT) 25 MG tablet, Take 50 mg by mouth every day., Disp: , Rfl:   •  Dexlansoprazole 60 MG CAPSULE DELAYED RELEASE delayed-release capsule, Take 60 mg by mouth every day., Disp: , Rfl:   •  Thiamine HCl (VITAMIN B-1 PO), Take 1 Tab by mouth every day., Disp: , Rfl:   •  VITAMIN D, CHOLECALCIFEROL, PO, Take 1 Tab by mouth every day., Disp: , Rfl:   •  VITAMIN E PO, Take 1 Tab by mouth every day., Disp: , Rfl:   •  Misc Natural Products (JOINT SUPPORT PO), Take 2 Tabs by mouth 2 Times a Day., Disp: , Rfl:   •  Calcium-Magnesium-Vitamin D (CALCIUM 500 PO), Take 1 Tab by mouth 2 Times a Day., Disp: , Rfl:   •  aspirin (ASA) 81 MG Chew Tab chewable tablet, Take 81 mg by mouth. Every other day, Disp: , Rfl:   •  Naproxen Sodium (ALEVE) 220 MG Cap, Take 440 mg by mouth every day., Disp: , Rfl:   •  valacyclovir (VALTREX) 1 GM Tab, Take 1,000 mg by mouth as needed (rash)., Disp: , Rfl:   ALLERGIES:   Allergies   Allergen Reactions   • Levaquin Unspecified     Cramping of legs   • Statins [Hmg-Coa-R Inhibitors]      Looks up her legs     SURGHX:   Past Surgical History:   Procedure Laterality Date   • PELVISCOPY  12/29/2017    Procedure: PELVISCOPY;  Surgeon: Haydee River M.D.;  Location: SURGERY SAME DAY [facility];  Service: Gynecology   • LAPAROSCOPIC BILATERAL SALPINGO OOPHERECTOMY Bilateral 12/29/2017    Procedure: LAPAROSCOPIC BILATERAL SALPINGO OOPHERECTOMY;  Surgeon: Haydee River M.D.;  Location: SURGERY SAME DAY [facility];  Service: Gynecology   • SEPTOPLASTY N/A 1/15/2016     "Procedure: SEPTOPLASTY;  Surgeon: Randolph Kinney M.D.;  Location: SURGERY SAME DAY [facility];  Service:    • TURBINOPLASTY Bilateral 1/15/2016    Procedure: TURBINOPLASTY;  Surgeon: Randolph Kinney M.D.;  Location: SURGERY SAME DAY [facility];  Service:    • MASTECTOMY  2001   • LUMPECTOMY     • OTHER      Breast cancer and breast implants   • OTHER      sinus surgery   • OTHER ORTHOPEDIC SURGERY      bunions alethea   • PB RADIATION THERAPY PLAN SIMPLE       SOCHX:  reports that she has never smoked. She has never used smokeless tobacco. She reports that she drinks about 3.0 oz of alcohol per week . She reports that she does not use drugs.  Family History   Problem Relation Age of Onset   • Breast Cancer Mother    • Stroke Mother    • Cancer Father         brain- most likely mets   • Heart Disease Father    • Cancer Sister 55        ovarian cancer - BRCA checked for her and was negative   • Diabetes Brother    • Diabetes Maternal Grandfather    • Cancer Sister 59        brain and breast cancer        Objective:   /60 (BP Location: Left arm, Patient Position: Sitting, BP Cuff Size: Adult)   Pulse 72   Temp 37.6 °C (99.7 °F) (Temporal)   Resp 16   Ht 1.702 m (5' 7\")   Wt 83.9 kg (185 lb)   SpO2 96%   BMI 28.98 kg/m²     Physical Exam   Constitutional: She is oriented to person, place, and time. She appears well-developed and well-nourished. No distress.   HENT:   Head: Normocephalic and atraumatic.   Mouth/Throat: Uvula swelling present. Posterior oropharyngeal edema and posterior oropharyngeal erythema present. No oropharyngeal exudate. Tonsils are 2+ on the right. Tonsils are 2+ on the left.   Eyes: Pupils are equal, round, and reactive to light. Conjunctivae are normal.   Neck: Normal range of motion. Neck supple.   Cardiovascular: Normal rate and regular rhythm.    Pulmonary/Chest: Effort normal. No respiratory distress. She has wheezes. She has no rales.   Lymphadenopathy:     She has " cervical adenopathy.   Neurological: She is alert and oriented to person, place, and time.   Skin: Skin is warm and dry.   Psychiatric: She has a normal mood and affect. Her behavior is normal.   Vitals reviewed.    Rapid strep: negative   Influenza A: pos   Influenza B: neg      Assessment/Plan:     1. Influenza A    - oseltamivir (TAMIFLU) 75 MG Cap; Take 1 Cap by mouth 2 times a day for 5 days.  Dispense: 10 Cap; Refill: 0  - benzonatate (TESSALON) 100 MG Cap; Take 1 Cap by mouth 3 times a day as needed for Cough for up to 5 days.  Dispense: 15 Cap; Refill: 0  - MethylPREDNISolone (MEDROL DOSEPAK) 4 MG Tablet Therapy Pack; Take 1 Tab by mouth every day for 6 days. Take as tapered-dosage schedule  Dispense: 1 Kit; Refill: 0    2. Sore throat    - POCT Rapid Strep A - neg     3. Cough    Supportive care reviewed. Monitor fever closely. Continue otc cold medication. Increase fluid, humidifier and warm salt water gargles. Educational handout on influenza provided.     Follow-up with primary care provider within 7-10 days.  If symptoms worsen or persist patient can contact me or return to clinic for follow-up.  Red flags and STRICT emergency room precautions discussed.  Patient appears understanding of information.

## 2018-12-27 NOTE — PATIENT INSTRUCTIONS
"Tamiflu twice a day for 5 days   Medrol dose pack taper twice a day for 6 days (breakfast and lunch)     Tessalon as needed for cough     Increase fluids   Humidifier   Warm salt water gargles       Influenza, Adult  Influenza (“the flu\") is an infection in the lungs, nose, and throat (respiratory tract). It is caused by a virus. The flu causes many common cold symptoms, as well as a high fever and body aches. It can make you feel very sick.  The flu spreads easily from person to person (is contagious). Getting a flu shot (influenza vaccination) every year is the best way to prevent the flu.  Follow these instructions at home:  · Take over-the-counter and prescription medicines only as told by your doctor.  · Use a cool mist humidifier to add moisture (humidity) to the air in your home. This can make it easier to breathe.  · Rest as needed.  · Drink enough fluid to keep your pee (urine) clear or pale yellow.  · Cover your mouth and nose when you cough or sneeze.  · Wash your hands with soap and water often, especially after you cough or sneeze. If you cannot use soap and water, use hand .  · Stay home from work or school as told by your doctor. Unless you are visiting your doctor, try to avoid leaving home until your fever has been gone for 24 hours without the use of medicine.  · Keep all follow-up visits as told by your doctor. This is important.  How is this prevented?  · Getting a yearly (annual) flu shot is the best way to avoid getting the flu. You may get the flu shot in late summer, fall, or winter. Ask your doctor when you should get your flu shot.  · Wash your hands often or use hand  often.  · Avoid contact with people who are sick during cold and flu season.  · Eat healthy foods.  · Drink plenty of fluids.  · Get enough sleep.  · Exercise regularly.  Contact a doctor if:  · You get new symptoms.  · You have:  ¨ Chest pain.  ¨ Watery poop (diarrhea).  ¨ A fever.  · Your cough gets " worse.  · You start to have more mucus.  · You feel sick to your stomach (nauseous).  · You throw up (vomit).  Get help right away if:  · You start to be short of breath or have trouble breathing.  · Your skin or nails turn a bluish color.  · You have very bad pain or stiffness in your neck.  · You get a sudden headache.  · You get sudden pain in your face or ear.  · You cannot stop throwing up.  This information is not intended to replace advice given to you by your health care provider. Make sure you discuss any questions you have with your health care provider.  Document Released: 09/26/2009 Document Revised: 05/25/2017 Document Reviewed: 10/11/2016  [employer] Interactive Patient Education © 2017 [employer]

## 2019-01-16 ENCOUNTER — OFFICE VISIT (OUTPATIENT)
Dept: INTERNAL MEDICINE | Facility: MEDICAL CENTER | Age: 69
End: 2019-01-16
Payer: COMMERCIAL

## 2019-01-16 ENCOUNTER — PATIENT MESSAGE (OUTPATIENT)
Dept: INTERNAL MEDICINE | Facility: MEDICAL CENTER | Age: 69
End: 2019-01-16

## 2019-01-16 VITALS
SYSTOLIC BLOOD PRESSURE: 122 MMHG | HEIGHT: 67 IN | BODY MASS INDEX: 30.61 KG/M2 | OXYGEN SATURATION: 95 % | TEMPERATURE: 98.6 F | HEART RATE: 70 BPM | DIASTOLIC BLOOD PRESSURE: 76 MMHG | WEIGHT: 195 LBS

## 2019-01-16 DIAGNOSIS — J01.00 SUBACUTE MAXILLARY SINUSITIS: ICD-10-CM

## 2019-01-16 DIAGNOSIS — J02.8 ACUTE PHARYNGITIS DUE TO OTHER SPECIFIED ORGANISMS: ICD-10-CM

## 2019-01-16 DIAGNOSIS — R05.9 COUGH: ICD-10-CM

## 2019-01-16 DIAGNOSIS — J06.9 ACUTE URI: ICD-10-CM

## 2019-01-16 PROCEDURE — 99213 OFFICE O/P EST LOW 20 MIN: CPT | Mod: GE | Performed by: INTERNAL MEDICINE

## 2019-01-16 RX ORDER — ALBUTEROL SULFATE 90 UG/1
2 AEROSOL, METERED RESPIRATORY (INHALATION) EVERY 6 HOURS PRN
Qty: 8.5 G | Refills: 0 | Status: SHIPPED | OUTPATIENT
Start: 2019-01-16 | End: 2019-08-19

## 2019-01-16 RX ORDER — BENZONATATE 100 MG/1
100 CAPSULE ORAL 3 TIMES DAILY PRN
Qty: 20 CAP | Refills: 0 | Status: SHIPPED | OUTPATIENT
Start: 2019-01-16 | End: 2019-04-12

## 2019-01-16 RX ORDER — AMOXICILLIN AND CLAVULANATE POTASSIUM 875; 125 MG/1; MG/1
1 TABLET, FILM COATED ORAL 2 TIMES DAILY
Qty: 14 TAB | Refills: 0 | Status: SHIPPED | OUTPATIENT
Start: 2019-01-16 | End: 2019-01-23

## 2019-01-16 RX ORDER — BENZONATATE 100 MG/1
100 CAPSULE ORAL 3 TIMES DAILY PRN
Qty: 20 CAP | Refills: 0 | Status: SHIPPED | OUTPATIENT
Start: 2019-01-16 | End: 2019-01-16 | Stop reason: SDUPTHER

## 2019-01-16 RX ORDER — AMOXICILLIN AND CLAVULANATE POTASSIUM 875; 125 MG/1; MG/1
1 TABLET, FILM COATED ORAL 2 TIMES DAILY
Qty: 14 TAB | Refills: 0 | Status: SHIPPED | OUTPATIENT
Start: 2019-01-16 | End: 2019-04-12

## 2019-01-16 ASSESSMENT — PATIENT HEALTH QUESTIONNAIRE - PHQ9
2. FEELING DOWN, DEPRESSED, IRRITABLE, OR HOPELESS: NOT AT ALL
SUM OF ALL RESPONSES TO PHQ9 QUESTIONS 1 AND 2: 0
1. LITTLE INTEREST OR PLEASURE IN DOING THINGS: NOT AT ALL

## 2019-01-16 ASSESSMENT — PAIN SCALES - GENERAL: PAINLEVEL: NO PAIN

## 2019-01-16 NOTE — TELEPHONE ENCOUNTER
From: Lang Estrella  To: Doyle Phan M.D.  Sent: 1/16/2019 7:14 AM PST  Subject: Non-Urgent Medical Question    Dr. Phan,    I am still coughing and having bloody nasal discharge. I think that I need to come and see you for a follow up appointment and possible medication for sinus infection and/or possible upper respiratory infection.     Thanks, Lang Estrella

## 2019-01-17 NOTE — PATIENT INSTRUCTIONS
"Upper Respiratory Infection, Adult  Most upper respiratory infections (URIs) are caused by a virus. A URI affects the nose, throat, and upper air passages. The most common type of URI is often called \"the common cold.\"  Follow these instructions at home:  · Take medicines only as told by your doctor.  · Gargle warm saltwater or take cough drops to  comfort your throat as told by your doctor.  · Use a warm mist humidifier or inhale steam from a shower to increase air moisture. This may make it easier to breathe.  · Drink enough fluid to keep your pee (urine) clear or pale yellow.  · Eat soups and other clear broths.  · Have a healthy diet.  · Rest as needed.  · Go back to work when your fever is gone or your doctor says it is okay.  ¨ You may need to stay home longer to avoid giving your URI to others.  ¨ You can also wear a face mask and wash your hands often to prevent spread of the virus.  · Use your inhaler more if you have asthma.  · Do not use any tobacco products, including cigarettes, chewing tobacco, or electronic cigarettes. If you need help quitting, ask your doctor.  Contact a doctor if:  · You are getting worse, not better.  · Your symptoms are not helped by medicine.  · You have chills.  · You are getting more short of breath.  · You have brown or red mucus.  · You have yellow or brown discharge from your nose.  · You have pain in your face, especially when you bend forward.  · You have a fever.  · You have puffy (swollen) neck glands.  · You have pain while swallowing.  · You have white areas in the back of your throat.  Get help right away if:  · You have very bad or constant:  ¨ Headache.  ¨ Ear pain.  ¨ Pain in your forehead, behind your eyes, and over your cheekbones (sinus pain).  ¨ Chest pain.  · You have long-lasting (chronic) lung disease and any of the following:  ¨ Wheezing.  ¨ Long-lasting cough.  ¨ Coughing up blood.  ¨ A change in your usual mucus.  · You have a stiff neck.  · You have " changes in your:  ¨ Vision.  ¨ Hearing.  ¨ Thinking.  ¨ Mood.  This information is not intended to replace advice given to you by your health care provider. Make sure you discuss any questions you have with your health care provider.  Document Released: 06/05/2009 Document Revised: 08/20/2017 Document Reviewed: 03/25/2015  Elsevier Interactive Patient Education © 2017 [employer]

## 2019-01-17 NOTE — PROGRESS NOTES
Chief Complaint   Patient presents with   • URI     per pt has been coughing feeling tight chest as well as bloody mucus        HPI: Patient is a 68 y.o. female, recently treated for influenza A, complaining of 14 days of illness including: chills, productive of yellow, green, blood streaked sputum cough, facial pain, fever, nasal congestion, rhinorrhea, sore throat, wheezing.   Mucus is: green.  Similarly ill exposures: yes.  Treatments tried: Mucinex,  She  reports that she has never smoked. She has never used smokeless tobacco..     ROS:  Admits to on and off fever and chills, headaches, facial pain, congestion, nasal discharge greenish to yellow wheezing,  Denies blurry vision,     I reviewed the patient's medications, allergies and medical history:  Current Outpatient Prescriptions   Medication Sig Dispense Refill   • donepezil (ARICEPT) 5 MG Tab Take 1 Tab by mouth every day. 90 Tab 1   • SYNTHROID 150 MCG Tab Take 1 Tab by mouth Every morning on an empty stomach. 90 Tab 1   • RESTASIS 0.05 % ophthalmic emulsion      • ranitidine (ZANTAC) 300 MG tablet      • oxybutynin (DITROPAN) 5 MG Tab Take 1 Tab by mouth 3 times a day. 90 Tab 2   • Magnesium 500 MG Tab Take  by mouth.     • sertraline (ZOLOFT) 25 MG tablet Take 50 mg by mouth every day.     • valacyclovir (VALTREX) 1 GM Tab Take 1,000 mg by mouth as needed (rash).     • Dexlansoprazole 60 MG CAPSULE DELAYED RELEASE delayed-release capsule Take 60 mg by mouth every day.     • Thiamine HCl (VITAMIN B-1 PO) Take 1 Tab by mouth every day.     • VITAMIN D, CHOLECALCIFEROL, PO Take 1 Tab by mouth every day.     • VITAMIN E PO Take 1 Tab by mouth every day.     • Misc Natural Products (JOINT SUPPORT PO) Take 2 Tabs by mouth 2 Times a Day.     • aspirin (ASA) 81 MG Chew Tab chewable tablet Take 81 mg by mouth. Every other day     • traZODone (DESYREL) 50 MG Tab Take 1 Tab by mouth at bedtime as needed for Sleep. 90 Tab 1   • Naproxen Sodium (ALEVE) 220 MG Cap  "Take 440 mg by mouth every day.     • Calcium-Magnesium-Vitamin D (CALCIUM 500 PO) Take 1 Tab by mouth 2 Times a Day.       No current facility-administered medications for this visit.      Levaquin and Statins [hmg-coa-r inhibitors]  Past Medical History:   Diagnosis Date   • Arthritis     haNDS   • Breast cancer (HCC)    • Bronchitis    • Cancer (HCC) 2000    breast   • Cold 11/2017    sinus infection- on augmentin   • Disorder of thyroid    • Heart burn    • Hiatus hernia syndrome    • Indigestion    • Pneumonia    • Psychiatric problem    • Snoring    • Urinary incontinence         EXAM:  Blood pressure 122/76, pulse 70, temperature 37 °C (98.6 °F), temperature source Temporal, height 1.702 m (5' 7\"), weight 88.5 kg (195 lb), SpO2 95 %, not currently breastfeeding.  General: Alert, no conversational dyspnea or audible wheeze, non-toxic appearance.  Eyes: PERRL, conjunctiva slightly injected, no eye discharge.  Ears: Normal pinnae,TM's normal bilaterally.  Nares: Patent with clear mucus.  Sinuses: tender over maxillary / frontal sinuses.  Throat: Erythematous injection without exudate.   Neck: Supple, with no adenopathy.  Lungs: Clear to auscultation bilaterally, no wheeze, crackles or rhonchi.   Heart: Regular rate without murmur.  Skin: Warm and dry without rash.     ASSESSMENT:   1. Cough    2. Acute pharyngitis due to other specified organisms    3. Subacute maxillary sinusitis    4. Acute URI     5.  Possible superimposed bacterial infection     PLAN:  1. Educated patient that majority of upper respiratory infections are viral, but however since her cough has been going on for over 2 weeks, associated with productive sputum, on and off chills and fever she may likely have superimposed bacterial infection..   As symptoms have been worsening over the last week, will treat with antibiotics.  start with Augmentin 1 tab twice a day for 7 days  Continue Mucinex, and Tessalon  2. Twice daily use of nasal saline " rinse or Neti-Pot.  3. OTC anti-pyretics such as Tylenol and decongestants as needed.  4. Follow-up in office or urgent care for worsening symptoms, difficulty breathing, lack of expected recovery, or should new symptoms or problems arise.

## 2019-02-11 ENCOUNTER — APPOINTMENT (OUTPATIENT)
Dept: INTERNAL MEDICINE | Facility: MEDICAL CENTER | Age: 69
End: 2019-02-11
Payer: COMMERCIAL

## 2019-02-22 ENCOUNTER — TELEPHONE (OUTPATIENT)
Dept: INTERNAL MEDICINE | Facility: MEDICAL CENTER | Age: 69
End: 2019-02-22

## 2019-02-22 NOTE — TELEPHONE ENCOUNTER
FYI~ Call from [facility] asking for refill of Sertaline, I declined it, we have never filled for pt. I called pt and asked her to be seen in office.

## 2019-03-09 DIAGNOSIS — E03.9 HYPOTHYROIDISM, UNSPECIFIED TYPE: ICD-10-CM

## 2019-03-11 RX ORDER — TRAZODONE HYDROCHLORIDE 50 MG/1
TABLET ORAL
Qty: 90 TAB | Refills: 1 | Status: SHIPPED | OUTPATIENT
Start: 2019-03-11 | End: 2020-07-16 | Stop reason: SDUPTHER

## 2019-03-11 RX ORDER — LEVOTHYROXINE SODIUM 150 MCG
TABLET ORAL
Qty: 90 TAB | Refills: 1 | Status: SHIPPED | OUTPATIENT
Start: 2019-03-11 | End: 2019-08-19 | Stop reason: SDUPTHER

## 2019-03-11 NOTE — TELEPHONE ENCOUNTER
I have refilled patients trazodone and synthroid. However, it has been a while since thyroid labs were done. I have ordered TSH and fT4 as well as CMP for patient to perform prior to next visit in March 2019.   Thank you!

## 2019-03-11 NOTE — TELEPHONE ENCOUNTER
Last seen: 01/16/19 by Dr. White  Next appt: 03/18/19 with Dr. Phan    Was the patient seen in the last year in this department? Yes   Does patient have an active prescription for medications requested? No   Received Request Via: Pharmacy

## 2019-04-09 ENCOUNTER — TELEPHONE (OUTPATIENT)
Dept: INTERNAL MEDICINE | Facility: MEDICAL CENTER | Age: 69
End: 2019-04-09

## 2019-04-09 DIAGNOSIS — E78.5 DYSLIPIDEMIA: ICD-10-CM

## 2019-04-09 NOTE — TELEPHONE ENCOUNTER
1. Caller Name: Lang Estrella                      Call Back Number: [phone] (home)     2. Message: Pt thought you were ordering a lipid panel, had labs drawn this am and [facility] fax [phone] is holding the vial. Please order.     3. Patient approves office to leave a detailed voicemail/MyChart message: yes

## 2019-04-10 LAB
ALBUMIN SERPL-MCNC: 4.4 G/DL (ref 3.6–4.8)
ALBUMIN/GLOB SERPL: 2 {RATIO} (ref 1.2–2.2)
ALP SERPL-CCNC: 95 IU/L (ref 39–117)
ALT SERPL-CCNC: 13 IU/L (ref 0–32)
AST SERPL-CCNC: 15 IU/L (ref 0–40)
BILIRUB SERPL-MCNC: 0.3 MG/DL (ref 0–1.2)
BUN SERPL-MCNC: 22 MG/DL (ref 8–27)
BUN/CREAT SERPL: 23 (ref 12–28)
CALCIUM SERPL-MCNC: 9.4 MG/DL (ref 8.7–10.3)
CHLORIDE SERPL-SCNC: 106 MMOL/L (ref 96–106)
CHOLEST SERPL-MCNC: 236 MG/DL (ref 100–199)
CO2 SERPL-SCNC: 19 MMOL/L (ref 20–29)
CREAT SERPL-MCNC: 0.95 MG/DL (ref 0.57–1)
GLOBULIN SER CALC-MCNC: 2.2 G/DL (ref 1.5–4.5)
GLUCOSE SERPL-MCNC: 88 MG/DL (ref 65–99)
HDLC SERPL-MCNC: 67 MG/DL
LABORATORY COMMENT REPORT: ABNORMAL
LDLC SERPL CALC-MCNC: 151 MG/DL (ref 0–99)
POTASSIUM SERPL-SCNC: 4.6 MMOL/L (ref 3.5–5.2)
PROT SERPL-MCNC: 6.6 G/DL (ref 6–8.5)
SODIUM SERPL-SCNC: 139 MMOL/L (ref 134–144)
T3 SERPL-MCNC: 81 NG/DL (ref 71–180)
T4 FREE SERPL-MCNC: 1.75 NG/DL (ref 0.82–1.77)
TRIGL SERPL-MCNC: 88 MG/DL (ref 0–149)
TSH SERPL DL<=0.005 MIU/L-ACNC: 11.95 UIU/ML (ref 0.45–4.5)
VLDLC SERPL CALC-MCNC: 18 MG/DL (ref 5–40)

## 2019-04-12 ENCOUNTER — OFFICE VISIT (OUTPATIENT)
Dept: INTERNAL MEDICINE | Facility: MEDICAL CENTER | Age: 69
End: 2019-04-12
Payer: COMMERCIAL

## 2019-04-12 VITALS
SYSTOLIC BLOOD PRESSURE: 109 MMHG | HEART RATE: 75 BPM | BODY MASS INDEX: 31.08 KG/M2 | OXYGEN SATURATION: 96 % | HEIGHT: 67 IN | TEMPERATURE: 98.7 F | DIASTOLIC BLOOD PRESSURE: 72 MMHG | WEIGHT: 198 LBS

## 2019-04-12 DIAGNOSIS — E78.5 DYSLIPIDEMIA: ICD-10-CM

## 2019-04-12 DIAGNOSIS — R22.1 NODULE OF NECK: ICD-10-CM

## 2019-04-12 DIAGNOSIS — E03.9 HYPOTHYROIDISM, UNSPECIFIED TYPE: ICD-10-CM

## 2019-04-12 DIAGNOSIS — Z78.9 STATIN INTOLERANCE: ICD-10-CM

## 2019-04-12 DIAGNOSIS — F32.A DEPRESSION, UNSPECIFIED DEPRESSION TYPE: ICD-10-CM

## 2019-04-12 PROBLEM — Z00.00 HEALTH CARE MAINTENANCE: Status: ACTIVE | Noted: 2019-04-12

## 2019-04-12 PROCEDURE — 99214 OFFICE O/P EST MOD 30 MIN: CPT | Mod: GC | Performed by: INTERNAL MEDICINE

## 2019-04-12 RX ORDER — EZETIMIBE 10 MG/1
10 TABLET ORAL DAILY
Qty: 90 TAB | Refills: 1 | Status: SHIPPED | OUTPATIENT
Start: 2019-04-12 | End: 2019-11-19 | Stop reason: SDUPTHER

## 2019-04-12 RX ORDER — LIOTHYRONINE SODIUM 5 UG/1
5 TABLET ORAL 2 TIMES DAILY
Qty: 60 TAB | Refills: 1 | Status: SHIPPED | OUTPATIENT
Start: 2019-04-12 | End: 2019-06-08 | Stop reason: SDUPTHER

## 2019-04-12 RX ORDER — NAPROXEN 500 MG/1
TABLET ORAL
COMMUNITY
Start: 2019-04-03 | End: 2019-08-19

## 2019-04-12 ASSESSMENT — PATIENT HEALTH QUESTIONNAIRE - PHQ9
3. TROUBLE FALLING OR STAYING ASLEEP OR SLEEPING TOO MUCH: NEARLY EVERY DAY
9. THOUGHTS THAT YOU WOULD BE BETTER OFF DEAD, OR OF HURTING YOURSELF: NOT AT ALL
1. LITTLE INTEREST OR PLEASURE IN DOING THINGS: NEARLY EVERY DAY
8. MOVING OR SPEAKING SO SLOWLY THAT OTHER PEOPLE COULD HAVE NOTICED. OR THE OPPOSITE, BEING SO FIGETY OR RESTLESS THAT YOU HAVE BEEN MOVING AROUND A LOT MORE THAN USUAL: NOT AT ALL
7. TROUBLE CONCENTRATING ON THINGS, SUCH AS READING THE NEWSPAPER OR WATCHING TELEVISION: MORE THAN HALF THE DAYS
4. FEELING TIRED OR HAVING LITTLE ENERGY: NEARLY EVERY DAY
SUM OF ALL RESPONSES TO PHQ9 QUESTIONS 1 AND 2: 5
SUM OF ALL RESPONSES TO PHQ QUESTIONS 1-9: 18
2. FEELING DOWN, DEPRESSED, IRRITABLE, OR HOPELESS: MORE THAN HALF THE DAYS
5. POOR APPETITE OR OVEREATING: NEARLY EVERY DAY
6. FEELING BAD ABOUT YOURSELF - OR THAT YOU ARE A FAILURE OR HAVE LET YOURSELF OR YOUR FAMILY DOWN: MORE THAN HALF THE DAYS

## 2019-04-12 NOTE — PROGRESS NOTES
Established Patient    Lang presents today with the following:    CC: follow up on hypothyroidism, DLD, depression, nodule     HPI:   68-year-old pleasant female with past medical history of hypothyroidism, GERD, depression, anxiety, dyslipidemia is here to follow-up on depression, dyslipidemia and hypothyroidism and she complains of nodule at the left base of skull.     Please see assessment and plan below for discussion of patient's medical concerns.    Patient Active Problem List    Diagnosis Date Noted   • Health care maintenance 04/12/2019   • Dyslipidemia 05/10/2018   • Personal history of breast cancer 12/23/2017   • Family history of breast cancer 12/23/2017   • Herpes zoster without complication 12/23/2017   • Depression 12/22/2017   • Anxiety 12/22/2017   • GERD (gastroesophageal reflux disease) 12/22/2017   • Hypothyroidism 12/22/2017   • History of hyperlipidemia 12/22/2017       Current Outpatient Prescriptions   Medication Sig Dispense Refill   • sertraline (ZOLOFT) 50 MG Tab Take 1 Tab by mouth every day. 90 Tab 1   • ezetimibe (ZETIA) 10 MG Tab Take 1 Tab by mouth every day. 90 Tab 1   • liothyronine (CYTOMEL) 5 MCG Tab Take 1 Tab by mouth 2 Times a Day. 60 Tab 1   • SYNTHROID 150 MCG Tab TAKE 1 TABLET BY MOUTH  EVERY MORNING ON AN EMPTY  STOMACH 90 Tab 1   • traZODone (DESYREL) 50 MG Tab TAKE 1 TABLET BY MOUTH AT  BEDTIME AS NEEDED FOR SLEEP 90 Tab 1   • albuterol 108 (90 Base) MCG/ACT Aero Soln inhalation aerosol Inhale 2 Puffs by mouth every 6 hours as needed for Shortness of Breath. 8.5 g 0   • donepezil (ARICEPT) 5 MG Tab Take 1 Tab by mouth every day. 90 Tab 1   • RESTASIS 0.05 % ophthalmic emulsion      • ranitidine (ZANTAC) 300 MG tablet      • oxybutynin (DITROPAN) 5 MG Tab Take 1 Tab by mouth 3 times a day. 90 Tab 2   • Magnesium 500 MG Tab Take  by mouth.     • valacyclovir (VALTREX) 1 GM Tab Take 1,000 mg by mouth as needed (rash).     • Dexlansoprazole 60 MG CAPSULE DELAYED  "RELEASE delayed-release capsule Take 60 mg by mouth every day.     • Thiamine HCl (VITAMIN B-1 PO) Take 1 Tab by mouth every day.     • VITAMIN D, CHOLECALCIFEROL, PO Take 1 Tab by mouth every day.     • VITAMIN E PO Take 1 Tab by mouth every day.     • Misc Natural Products (JOINT SUPPORT PO) Take 2 Tabs by mouth 2 Times a Day.     • Calcium-Magnesium-Vitamin D (CALCIUM 500 PO) Take 1 Tab by mouth 2 Times a Day.     • aspirin (ASA) 81 MG Chew Tab chewable tablet Take 81 mg by mouth. Every other day     • naproxen (NAPROSYN) 500 MG Tab        No current facility-administered medications for this visit.        ROS: As per HPI. Additional pertinent systems as noted below.  Constitutional: Negative for chills and fever.   HENT: Negative for ear pain and sore throat.    Eyes: Negative for discharge and redness.   Respiratory: Negative for cough, hemoptysis, wheezing and stridor.    Cardiovascular: Negative for chest pain, palpitations and leg swelling.   Gastrointestinal: Negative for abdominal pain, constipation, diarrhea, heartburn, nausea and vomiting.   Genitourinary: Negative for dysuria, flank pain and hematuria.   Musculoskeletal: Negative for falls and myalgias.   Skin: Negative for itching and rash.   Neurological: Negative for dizziness, seizures, loss of consciousness and headaches.   Endo/Heme/Allergies: Negative for polydipsia. Does not bruise/bleed easily.   Psychiatric/Behavioral: Negative for substance abuse and suicidal ideas.       /72 (BP Location: Left arm, Patient Position: Sitting, BP Cuff Size: Adult)   Pulse 75   Temp 37.1 °C (98.7 °F) (Temporal)   Ht 1.702 m (5' 7\")   Wt 89.8 kg (198 lb)   SpO2 96%   BMI 31.01 kg/m²     Physical Exam   Constitutional:  oriented to person, place, and time. No distress.   Eyes: Pupils are equal, round, and reactive to light. No scleral icterus.  Neck: Neck supple. No thyromegaly present. Non tender, non mobile, Firm/hard 1cm oval nodule palpable at " left skull base. No erythema, discharge, or skin changes noted.   Cardiovascular: Normal rate, regular rhythm and normal heart sounds.  Exam reveals no gallop and no friction rub.  No murmur heard.  Pulmonary/Chest: Breath sounds normal with no wheezing or crackles.    Musculoskeletal:   no edema.   Lymphadenopathy: no cervical adenopathy  Neurological: alert and oriented to person, place, and time.   Skin: No cyanosis. Nails show no clubbing.    Note: I have reviewed all pertinent labs and diagnostic tests associated with this visit with specific comments listed under the assessment and plan below    Assessment and Plan    1. Hypothyroidism, unspecified type  Uncontrolled hypothyroidism on 150mcg of synthroid.  Patient was on Cytomel 5 mcg twice daily in the past and would really like to go back on that. She felt really well when she had cytomel. Continue synthroid 150mcg daily with addition of cytomel 5mcg BID. Recheck labs in 8 weeks.   -     liothyronine (CYTOMEL) 5 MCG Tab; Take 1 Tab by mouth 2 Times a Day.  -     TSH+FREE T4    2. Depression, unspecified depression type  Active depressive symptoms with no SI/HI. PHQ9 score of 18. Has been taking zoloft 25mg daily instead of 50mg. Increased dose of zoloft to 50mg daily.     3. Statin intolerance  4. Dyslipidemia  Patient started taking atorvastatin a few months ago but stopped soon after due to lower extremity myalgias and joint pains.  Patient says she has tried multiple statins in the past and was not able to tolerate them.  Lipid panel continues to be about the same with no improvement from lifestyle modifications. She will begin zetia and take every day for a few weeks to see how she does. After a few weeks will restart lipitor, but once weekly and if tolerated increase to twice weekly and remain at that. Follow up in 3 months.   -     ezetimibe (ZETIA) 10 MG Tab; Take 1 Tab by mouth every day.    5. Nodule of neck  Fairly asymptomatic 1cm nodule at left  base of skull notice some time ago with no change in size. Some discomfort on pressing or moving neck, but not really painful or itchy. No discharge. No constitutional symptoms other than fatigue.   -     US-SOFT TISSUES OF HEAD - NECK; Future      Followup: Return in about 3 months (around 7/12/2019).      Signed by: Doyle Phan M.D.

## 2019-04-12 NOTE — LETTER
[facility]  Doyle Phan M.D.  [address]  [place] [address]  Fax: [phone]   Authorization for Release/Disclosure of   Protected Health Information   Name: XAVIER ESTRELLA : 1950 SSN: [id number]   Address: [address] [place] [address]   [place] [address] Phone:    [phone] (home)    I authorize the entity listed below to release/disclose the PHI below to:   [facility]/Doyle Phan M.D. and Doyle Phan M.D.   Provider or Entity Name:     Address   City, State, Zip   Phone:      Fax:     Reason for request: continuity of care   Information to be released:    [  ] LAST COLONOSCOPY,  including any PATH REPORT and follow-up  [  ] LAST FIT/COLOGUARD RESULT [  ] LAST DEXA  [  ] LAST MAMMOGRAM  [  ] LAST PAP  [  ] LAST LABS [  ] RETINA EXAM REPORT  [  ] IMMUNIZATION RECORDS  [  ] Release all info      [  ] Check here and initial the line next to each item to release ALL health information INCLUDING  _____ Care and treatment for drug and / or alcohol abuse  _____ HIV testing, infection status, or AIDS  _____ Genetic Testing    DATES OF SERVICE OR TIME PERIOD TO BE DISCLOSED: _____________  I understand and acknowledge that:  * This Authorization may be revoked at any time by you in writing, except if your health information has already been used or disclosed.  * Your health information that will be used or disclosed as a result of you signing this authorization could be re-disclosed by the recipient. If this occurs, your re-disclosed health information may no longer be protected by State or Federal laws.  * You may refuse to sign this Authorization. Your refusal will not affect your ability to obtain treatment.  * This Authorization becomes effective upon signing and will  on (date) __________.      If no date is indicated, this Authorization will  one (1) year from the signature date.    Name: Xavier Estrella    Signature:   Date:     2019       PLEASE FAX REQUESTED RECORDS BACK TO:  [phone]

## 2019-05-18 ENCOUNTER — HOSPITAL ENCOUNTER (OUTPATIENT)
Dept: RADIOLOGY | Facility: MEDICAL CENTER | Age: 69
End: 2019-05-18
Attending: STUDENT IN AN ORGANIZED HEALTH CARE EDUCATION/TRAINING PROGRAM
Payer: COMMERCIAL

## 2019-05-18 DIAGNOSIS — R22.1 NODULE OF NECK: ICD-10-CM

## 2019-05-18 PROCEDURE — 76536 US EXAM OF HEAD AND NECK: CPT

## 2019-05-21 ENCOUNTER — PATIENT MESSAGE (OUTPATIENT)
Dept: INTERNAL MEDICINE | Facility: MEDICAL CENTER | Age: 69
End: 2019-05-21

## 2019-05-21 NOTE — TELEPHONE ENCOUNTER
From: Lang Estrella  To: Doyle Phan M.D.  Sent: 5/21/2019 3:52 PM PDT  Subject: Non-Urgent Medical Question    Dr. Phan,    I would like to schedule an appointment with you for the middle of June to go over test results for Thyroid issues.    Thanks,  Lang Estrella

## 2019-06-08 DIAGNOSIS — E03.9 HYPOTHYROIDISM, UNSPECIFIED TYPE: ICD-10-CM

## 2019-06-10 RX ORDER — LIOTHYRONINE SODIUM 5 UG/1
TABLET ORAL
Qty: 180 TAB | Refills: 0 | Status: SHIPPED | OUTPATIENT
Start: 2019-06-10 | End: 2019-08-19

## 2019-06-10 NOTE — TELEPHONE ENCOUNTER
Last seen: 04/12/19 by Dr. Phan  Next appt: 06/26/19 with Dr. Phan    Was the patient seen in the last year in this department? Yes   Does patient have an active prescription for medications requested? No   Received Request Via: Pharmacy

## 2019-06-19 ENCOUNTER — PATIENT MESSAGE (OUTPATIENT)
Dept: INTERNAL MEDICINE | Facility: MEDICAL CENTER | Age: 69
End: 2019-06-19

## 2019-06-19 NOTE — PATIENT COMMUNICATION
Spoke to pt needs orders faxed to [facility] on [facility]. Lab order printed and faxed to [facility] per pt request

## 2019-06-19 NOTE — TELEPHONE ENCOUNTER
From: Lang Estrella  To: Doyle Phan M.D.  Sent: 6/19/2019 1:09 PM PDT  Subject: Procedure Question    Dr. Phan,    I am supposed to get blood work done, before my appointment with you next week.    Can you please let me know where that blood work request was sent and so where I need to go to get the blood work done?    Thanks, Lang Estrella ([birth year])

## 2019-06-22 LAB
T4 FREE SERPL-MCNC: 2.15 NG/DL (ref 0.82–1.77)
TSH SERPL DL<=0.005 MIU/L-ACNC: 1.58 UIU/ML (ref 0.45–4.5)

## 2019-06-26 ENCOUNTER — OFFICE VISIT (OUTPATIENT)
Dept: INTERNAL MEDICINE | Facility: MEDICAL CENTER | Age: 69
End: 2019-06-26
Payer: COMMERCIAL

## 2019-06-26 VITALS
WEIGHT: 190 LBS | OXYGEN SATURATION: 94 % | HEART RATE: 64 BPM | BODY MASS INDEX: 29.82 KG/M2 | HEIGHT: 67 IN | DIASTOLIC BLOOD PRESSURE: 60 MMHG | SYSTOLIC BLOOD PRESSURE: 102 MMHG | TEMPERATURE: 97.8 F

## 2019-06-26 DIAGNOSIS — E03.9 HYPOTHYROIDISM, UNSPECIFIED TYPE: ICD-10-CM

## 2019-06-26 DIAGNOSIS — M85.80 OSTEOPENIA, UNSPECIFIED LOCATION: ICD-10-CM

## 2019-06-26 DIAGNOSIS — E78.5 DYSLIPIDEMIA: ICD-10-CM

## 2019-06-26 PROCEDURE — 99213 OFFICE O/P EST LOW 20 MIN: CPT | Mod: GE | Performed by: INTERNAL MEDICINE

## 2019-06-26 NOTE — PROGRESS NOTES
Established Patient    Lang presents today with the following:    CC: dyslipidemia, osteopenia, and hypothyroidism    HPI:   68-year-old pleasant female with past medical history of hypothyroidism, GERD, osteopenia, depression, anxiety, dyslipidemia is here to follow-up on dyslipidemia, osteopenia, and hypothyroidism.     Please see assessment and plan below for discussion of patients medical concerns.     Patient Active Problem List    Diagnosis Date Noted   • Osteopenia 06/26/2019   • Health care maintenance 04/12/2019   • Dyslipidemia 05/10/2018   • Personal history of breast cancer 12/23/2017   • Family history of breast cancer 12/23/2017   • Herpes zoster without complication 12/23/2017   • Depression 12/22/2017   • Anxiety 12/22/2017   • GERD (gastroesophageal reflux disease) 12/22/2017   • Hypothyroidism 12/22/2017   • History of hyperlipidemia 12/22/2017       Current Outpatient Prescriptions   Medication Sig Dispense Refill   • liothyronine (CYTOMEL) 5 MCG Tab TAKE 1 TABLET BY MOUTH TWO  TIMES DAILY 180 Tab 0   • naproxen (NAPROSYN) 500 MG Tab      • sertraline (ZOLOFT) 50 MG Tab Take 1 Tab by mouth every day. 90 Tab 1   • ezetimibe (ZETIA) 10 MG Tab Take 1 Tab by mouth every day. 90 Tab 1   • SYNTHROID 150 MCG Tab TAKE 1 TABLET BY MOUTH  EVERY MORNING ON AN EMPTY  STOMACH 90 Tab 1   • traZODone (DESYREL) 50 MG Tab TAKE 1 TABLET BY MOUTH AT  BEDTIME AS NEEDED FOR SLEEP 90 Tab 1   • donepezil (ARICEPT) 5 MG Tab Take 1 Tab by mouth every day. 90 Tab 1   • RESTASIS 0.05 % ophthalmic emulsion      • ranitidine (ZANTAC) 300 MG tablet      • oxybutynin (DITROPAN) 5 MG Tab Take 1 Tab by mouth 3 times a day. 90 Tab 2   • Magnesium 500 MG Tab Take  by mouth.     • valacyclovir (VALTREX) 1 GM Tab Take 1,000 mg by mouth as needed (rash).     • Dexlansoprazole 60 MG CAPSULE DELAYED RELEASE delayed-release capsule Take 60 mg by mouth every day.     • Thiamine HCl (VITAMIN B-1 PO) Take 1 Tab by mouth every day.    "  • VITAMIN D, CHOLECALCIFEROL, PO Take 1 Tab by mouth every day.     • VITAMIN E PO Take 1 Tab by mouth every day.     • Misc Natural Products (JOINT SUPPORT PO) Take 2 Tabs by mouth 2 Times a Day.     • Calcium-Magnesium-Vitamin D (CALCIUM 500 PO) Take 1 Tab by mouth 2 Times a Day.     • aspirin (ASA) 81 MG Chew Tab chewable tablet Take 81 mg by mouth. Every other day     • albuterol 108 (90 Base) MCG/ACT Aero Soln inhalation aerosol Inhale 2 Puffs by mouth every 6 hours as needed for Shortness of Breath. (Patient not taking: Reported on 6/26/2019) 8.5 g 0     No current facility-administered medications for this visit.        ROS: As per HPI. Additional pertinent systems as noted below.  Constitutional: Negative for chills and fever.   HENT: Negative for ear pain and sore throat.    Eyes: Negative for discharge and redness.   Respiratory: Negative for cough, hemoptysis, wheezing and stridor.    Cardiovascular: Negative for chest pain, palpitations and leg swelling.   Gastrointestinal: Negative for abdominal pain, constipation, diarrhea, heartburn, nausea and vomiting.   Genitourinary: Negative for dysuria, flank pain and hematuria.   Musculoskeletal: Negative for falls and myalgias.   Skin: Negative for itching and rash.   Neurological: Negative for dizziness, seizures, loss of consciousness and headaches.   Endo/Heme/Allergies: Negative for polydipsia. Does not bruise/bleed easily.   Psychiatric/Behavioral: Negative for substance abuse and suicidal ideas.       /60 (BP Location: Left arm, Patient Position: Sitting, BP Cuff Size: Adult)   Pulse 64   Temp 36.6 °C (97.8 °F) (Temporal)   Ht 1.689 m (5' 6.5\")   Wt 86.2 kg (190 lb)   SpO2 94%   BMI 30.21 kg/m²     Physical Exam   Constitutional:  oriented to person, place, and time. No distress.   Eyes: Pupils are equal, round, and reactive to light. No scleral icterus.  Neck: Neck supple. No thyromegaly present.   Cardiovascular: Normal rate, regular " rhythm and normal heart sounds.  Exam reveals no gallop and no friction rub.  No murmur heard.  Pulmonary/Chest: Breath sounds normal with no wheezing or crackles.   Musculoskeletal:   no edema.   Lymphadenopathy: no cervical adenopathy  Neurological: alert and oriented to person, place, and time.   Skin: No cyanosis. Nails show no clubbing.    Note: I have reviewed all pertinent labs and diagnostic tests associated with this visit with specific comments listed under the assessment and plan below    Assessment and Plan    1. Hypothyroidism, unspecified type  Stable, controlled. Patients most recent TSH has normalized, decreased from ~11. However, her fT4 is slightly elevated at 2.15.   Recommend patient continue current dose of synthroid and decrease cytomel to 5mg once a day in the afternoon when she feels the most tired. Discontinue morning dose.   -     TSH; Future  -     FREE THYROXINE; Future    2. Dyslipidemia  Uncontrolled. Intolerant of statins due to myalgias. Patient was started on ezetemibe 10mg in 4/2019. She will try to take the atorvastatin 20mg 1-2 times weekly and see if she can tolerate that.   Repeat lipid panel and follow up in about 3 months.   -     Lipid Profile; Future    3. Osteopenia, unspecified location  H/o osteopenia. Takes vitamin D and calcium supplementation. Not sure what dose of calcium, but taking 5000 IUs of vitamin D.   Will check vitamin D levels to guide ongoing supplementation. Recommended calcium 1200mg per day.    -     VITAMIN D,25 HYDROXY; Future    Followup: Return in about 3 months (around 9/26/2019).      Signed by: Doyle Phan M.D.

## 2019-07-11 ENCOUNTER — APPOINTMENT (RX ONLY)
Dept: URBAN - METROPOLITAN AREA CLINIC 22 | Facility: CLINIC | Age: 69
Setting detail: DERMATOLOGY
End: 2019-07-11

## 2019-07-11 DIAGNOSIS — L72.0 EPIDERMAL CYST: ICD-10-CM

## 2019-07-11 DIAGNOSIS — L82.1 OTHER SEBORRHEIC KERATOSIS: ICD-10-CM

## 2019-07-11 DIAGNOSIS — Z71.89 OTHER SPECIFIED COUNSELING: ICD-10-CM

## 2019-07-11 DIAGNOSIS — D18.0 HEMANGIOMA: ICD-10-CM

## 2019-07-11 DIAGNOSIS — L81.4 OTHER MELANIN HYPERPIGMENTATION: ICD-10-CM

## 2019-07-11 DIAGNOSIS — D22 MELANOCYTIC NEVI: ICD-10-CM

## 2019-07-11 PROBLEM — D18.01 HEMANGIOMA OF SKIN AND SUBCUTANEOUS TISSUE: Status: ACTIVE | Noted: 2019-07-11

## 2019-07-11 PROBLEM — D22.5 MELANOCYTIC NEVI OF TRUNK: Status: ACTIVE | Noted: 2019-07-11

## 2019-07-11 PROCEDURE — ? ADDITIONAL NOTES

## 2019-07-11 PROCEDURE — ? EXTRACTIONS

## 2019-07-11 PROCEDURE — 99203 OFFICE O/P NEW LOW 30 MIN: CPT

## 2019-07-11 PROCEDURE — ? COUNSELING

## 2019-07-11 ASSESSMENT — LOCATION DETAILED DESCRIPTION DERM
LOCATION DETAILED: LOWER STERNUM
LOCATION DETAILED: INFERIOR MID FOREHEAD
LOCATION DETAILED: SUPERIOR THORACIC SPINE
LOCATION DETAILED: LEFT MEDIAL SUPERIOR TARSAL REGION
LOCATION DETAILED: INFERIOR THORACIC SPINE
LOCATION DETAILED: RIGHT VENTRAL PROXIMAL FOREARM
LOCATION DETAILED: LEFT VENTRAL PROXIMAL FOREARM

## 2019-07-11 ASSESSMENT — LOCATION SIMPLE DESCRIPTION DERM
LOCATION SIMPLE: UPPER BACK
LOCATION SIMPLE: LEFT MEDIAL SUPERIOR TARSAL REGION
LOCATION SIMPLE: CHEST
LOCATION SIMPLE: LEFT FOREARM
LOCATION SIMPLE: RIGHT FOREARM
LOCATION SIMPLE: INFERIOR FOREHEAD

## 2019-07-11 ASSESSMENT — LOCATION ZONE DERM
LOCATION ZONE: ARM
LOCATION ZONE: EYELID
LOCATION ZONE: FACE
LOCATION ZONE: TRUNK

## 2019-07-11 NOTE — PROCEDURE: EXTRACTIONS
Consent was obtained and risks were reviewed including but not limited to scarring, infection, bleeding, scabbing, incomplete removal, and allergy to anesthesia.
Render Post-Care Instructions In Note?: no
Prep Text (Optional): Prior to removal the treatment areas were prepped in the usual fashion. No charge for extraction
Extraction Method: 11 blade and q-tip
Detail Level: Detailed
Post-Care Instructions: I reviewed with the patient in detail post-care instructions. Patient is to keep the treatment areas dry overnight, and then apply bacitracin twice daily until healed. Patient may apply hydrogen peroxide soaks to remove any crusting.
Acne Type: Comedonal Lesions

## 2019-07-11 NOTE — HPI: FULL BODY SKIN EXAMINATION
How Severe Are Your Spot(S)?: severe
What Type Of Note Output Would You Prefer (Optional)?: Standard Output
What Is The Reason For Today's Visit?: Full Body Skin Examination
What Is The Reason For Today's Visit? (Being Monitored For X): concerning skin lesions on an annual basis
No

## 2019-08-12 ENCOUNTER — HOSPITAL ENCOUNTER (OUTPATIENT)
Dept: LAB | Facility: MEDICAL CENTER | Age: 69
End: 2019-08-12
Attending: STUDENT IN AN ORGANIZED HEALTH CARE EDUCATION/TRAINING PROGRAM
Payer: COMMERCIAL

## 2019-08-12 DIAGNOSIS — E78.5 DYSLIPIDEMIA: ICD-10-CM

## 2019-08-12 DIAGNOSIS — M85.80 OSTEOPENIA, UNSPECIFIED LOCATION: ICD-10-CM

## 2019-08-12 DIAGNOSIS — E03.9 HYPOTHYROIDISM, UNSPECIFIED TYPE: ICD-10-CM

## 2019-08-12 LAB
25(OH)D3 SERPL-MCNC: 56 NG/ML (ref 30–100)
CHOLEST SERPL-MCNC: 156 MG/DL (ref 100–199)
HDLC SERPL-MCNC: 58 MG/DL
LDLC SERPL CALC-MCNC: 81 MG/DL
T4 FREE SERPL-MCNC: 1.91 NG/DL (ref 0.53–1.43)
TRIGL SERPL-MCNC: 84 MG/DL (ref 0–149)
TSH SERPL DL<=0.005 MIU/L-ACNC: 4.93 UIU/ML (ref 0.38–5.33)

## 2019-08-12 PROCEDURE — 82306 VITAMIN D 25 HYDROXY: CPT

## 2019-08-12 PROCEDURE — 84443 ASSAY THYROID STIM HORMONE: CPT

## 2019-08-12 PROCEDURE — 80061 LIPID PANEL: CPT

## 2019-08-12 PROCEDURE — 84439 ASSAY OF FREE THYROXINE: CPT

## 2019-08-12 PROCEDURE — 36415 COLL VENOUS BLD VENIPUNCTURE: CPT

## 2019-08-19 ENCOUNTER — OFFICE VISIT (OUTPATIENT)
Dept: MEDICAL GROUP | Age: 69
End: 2019-08-19
Payer: COMMERCIAL

## 2019-08-19 VITALS
WEIGHT: 190.4 LBS | SYSTOLIC BLOOD PRESSURE: 104 MMHG | HEIGHT: 67 IN | DIASTOLIC BLOOD PRESSURE: 54 MMHG | HEART RATE: 71 BPM | BODY MASS INDEX: 29.88 KG/M2 | TEMPERATURE: 98.7 F | OXYGEN SATURATION: 95 %

## 2019-08-19 DIAGNOSIS — E55.9 VITAMIN D INSUFFICIENCY: ICD-10-CM

## 2019-08-19 DIAGNOSIS — R41.3 SHORT-TERM MEMORY LOSS: ICD-10-CM

## 2019-08-19 DIAGNOSIS — Z12.31 VISIT FOR SCREENING MAMMOGRAM: ICD-10-CM

## 2019-08-19 DIAGNOSIS — F33.0 MILD EPISODE OF RECURRENT MAJOR DEPRESSIVE DISORDER (HCC): ICD-10-CM

## 2019-08-19 DIAGNOSIS — N32.81 OVERACTIVE BLADDER: ICD-10-CM

## 2019-08-19 DIAGNOSIS — E78.5 DYSLIPIDEMIA: ICD-10-CM

## 2019-08-19 DIAGNOSIS — E03.9 ACQUIRED HYPOTHYROIDISM: ICD-10-CM

## 2019-08-19 DIAGNOSIS — K21.9 GASTROESOPHAGEAL REFLUX DISEASE WITHOUT ESOPHAGITIS: ICD-10-CM

## 2019-08-19 PROCEDURE — 99204 OFFICE O/P NEW MOD 45 MIN: CPT | Performed by: INTERNAL MEDICINE

## 2019-08-19 RX ORDER — LEVOTHYROXINE SODIUM 150 MCG
150 TABLET ORAL EVERY MORNING
Qty: 90 TAB | Refills: 3 | Status: SHIPPED | OUTPATIENT
Start: 2019-08-19 | End: 2020-06-08 | Stop reason: SDUPTHER

## 2019-08-19 RX ORDER — ATORVASTATIN CALCIUM 20 MG/1
20 TABLET, FILM COATED ORAL
COMMUNITY
End: 2019-11-19

## 2019-08-19 RX ORDER — LIOTHYRONINE SODIUM 5 UG/1
5 TABLET ORAL DAILY
Qty: 90 TAB | Refills: 3 | Status: SHIPPED | OUTPATIENT
Start: 2019-08-19 | End: 2020-02-24 | Stop reason: SDUPTHER

## 2019-08-19 SDOH — HEALTH STABILITY: MENTAL HEALTH: HOW MANY STANDARD DRINKS CONTAINING ALCOHOL DO YOU HAVE ON A TYPICAL DAY?: 1 OR 2

## 2019-08-19 SDOH — HEALTH STABILITY: MENTAL HEALTH: HOW OFTEN DO YOU HAVE 6 OR MORE DRINKS ON ONE OCCASION?: NEVER

## 2019-08-19 SDOH — HEALTH STABILITY: MENTAL HEALTH: HOW OFTEN DO YOU HAVE A DRINK CONTAINING ALCOHOL?: 2-4 TIMES A MONTH

## 2019-08-19 ASSESSMENT — PAIN SCALES - GENERAL: PAINLEVEL: NO PAIN

## 2019-08-19 NOTE — LETTER
[facility]  Pina Conway M.D.  [address]  [place] [address]  Fax: [phone]   Authorization for Release/Disclosure of   Protected Health Information   Name: XAVIER ESTRELLA : 1950 SSN: [id number]   Address: [address] [place] [address] [place] [address] Phone:    [phone] (home)    I authorize the entity listed below to release/disclose the PHI below to:   [facility]/Pina Conway M.D. and Pina Conway M.D.   Provider or Entity Name:  [facility]   Address   City, State, Zip   Phone:      Fax:     Reason for request: continuity of care   Information to be released:    [XXX] LAST COLONOSCOPY,  including any PATH REPORT and follow-up  [  ] LAST FIT/COLOGUARD RESULT [  ] LAST DEXA  [  ] LAST MAMMOGRAM  [  ] LAST PAP  [  ] LAST LABS [  ] RETINA EXAM REPORT  [  ] IMMUNIZATION RECORDS  [XXX] Release all info      [  ] Check here and initial the line next to each item to release ALL health information INCLUDING  _____ Care and treatment for drug and / or alcohol abuse  _____ HIV testing, infection status, or AIDS  _____ Genetic Testing    DATES OF SERVICE OR TIME PERIOD TO BE DISCLOSED: _____________  I understand and acknowledge that:  * This Authorization may be revoked at any time by you in writing, except if your health information has already been used or disclosed.  * Your health information that will be used or disclosed as a result of you signing this authorization could be re-disclosed by the recipient. If this occurs, your re-disclosed health information may no longer be protected by State or Federal laws.  * You may refuse to sign this Authorization. Your refusal will not affect your ability to obtain treatment.  * This Authorization becomes effective upon signing and will  on (date) __________.      If no date is indicated, this Authorization will  one (1) year from the signature date.    Name: Xavier Estrella    Signature:   Date:     2019       PLEASE FAX  REQUESTED RECORDS BACK TO: [phone]

## 2019-08-19 NOTE — PROGRESS NOTES
Pt states had Pap June 2019 Dr. [facility]. Pt also states had colonoscopy Jan. 2019 & upper [facility] Endoscopy.  Obtained pt signed consents.

## 2019-08-19 NOTE — LETTER
[facility]  Pina Conway M.D.  [address]  [place] [address]  Fax: [phone]   Authorization for Release/Disclosure of   Protected Health Information   Name: XAVIER WOLF : 1950 SSN: [id number]   Address: [address] [place] [address] [place] [address] Phone:    [phone] (home)    I authorize the entity listed below to release/disclose the PHI below to:   [facility]/Pina Conway M.D. and Pina Conway M.D.   Provider or Entity Name:  [facility] Boyd   [facility]  [address] [place] [address] [id number]  [place] [address] Phone:   [phone]    Fax:     Reason for request: continuity of care   Information to be released:    [  ] LAST COLONOSCOPY,  including any PATH REPORT and follow-up  [  ] LAST FIT/COLOGUARD RESULT [  ] LAST DEXA  [  ] LAST MAMMOGRAM  [XXX] LAST PAP  [  ] LAST LABS [  ] RETINA EXAM REPORT  [  ] IMMUNIZATION RECORDS  [XXX] Release all info      [  ] Check here and initial the line next to each item to release ALL health information INCLUDING  _____ Care and treatment for drug and / or alcohol abuse  _____ HIV testing, infection status, or AIDS  _____ Genetic Testing    DATES OF SERVICE OR TIME PERIOD TO BE DISCLOSED: _____________  I understand and acknowledge that:  * This Authorization may be revoked at any time by you in writing, except if your health information has already been used or disclosed.  * Your health information that will be used or disclosed as a result of you signing this authorization could be re-disclosed by the recipient. If this occurs, your re-disclosed health information may no longer be protected by State or Federal laws.  * You may refuse to sign this Authorization. Your refusal will not affect your ability to obtain treatment.  * This Authorization becomes effective upon signing and will  on (date) __________.      If no date is indicated, this Authorization will  one (1) year from the signature date.       Name: Lang Estrella    Signature:   Date:     8/19/2019       PLEASE FAX REQUESTED RECORDS BACK TO: [phone]

## 2019-08-19 NOTE — PROGRESS NOTES
Xavier Estrella is a 69 y.o. female here to establish care and the evaluation and management of:      HPI:      Xavier Estrella is a 69 y.o. female previously followed by Dr. Phan ([facility]) seen today to establish care. Her medical history is significant for history of breast cancer, hypothyroidism, dyslipidemia, and osteopenia.       Dyslipidemia  Chronic. Patient reports compliancy with ezetimibe 10 mg QD that was initiated by [facility] 04/2019. She additionally reports taking atorvastatin 20 mg on Monday's and Friday's for the past 2-3 months. She states she was previously intolerant to statins due to lower extremity myalgias, however she is able to tolerate bi-weekly atorvastatin. She is not currently taking daily baby aspirin. She has additionally started the keto diet. She denies history of stroke or heart attack. I reviewed recent blood work with her in clinic today, which showed significant improvement.    Results for XAVIER ESTRELLA (MRN [id number]) as of 8/19/2019 16:37   Ref. Range 4/9/2019 10:20 8/12/2019 07:12   Cholesterol,Tot Latest Ref Range: 100 - 199 mg/dL 236 (H) 156   Triglycerides Latest Ref Range: 0 - 149 mg/dL 88 84   HDL Latest Ref Range: >=40 mg/dL 67 58   LDL Latest Ref Range: <100 mg/dL 151 (H) 81       Hypothyroidism, unspecified type  Chronic. Patient reports taking synthroid 150 mcg on an empty stomach every morning and cytomel 5 mg every afternoon when most tired. She has previously tried generic levothyroxine however reports generic did not help to control her symptoms including fatigue. I reviewed recent blood work from 8/19/19 in clinic with her today, which indicated elevated Free T-4 at 1.91 and normal TSH at 4.930. She denies any palpitations or diaphoresis.    Results for XAVIER ESTRELLA (MRN [id number]) as of 8/19/2019 16:37   Ref. Range 6/21/2019 10:20 8/12/2019 07:12   TSH Latest Ref Range: 0.380 - 5.330 uIU/mL 1.580 4.930   Free T-4 Latest Ref Range: 0.53 - 1.43 ng/dL 2.15 (H)  1.91 (H)       Short-term memory loss  Chronic. Patient states that she has been taking donepezil 5 mg QD for several years due to gradually worsened short term memory. She denies a personal history of dementia, however reports her mother did have dementia. She additionally takes daily vitamin B complex.     Gastroesophageal reflux disease without esophagitis  Chronic. Patient reports taking dexlansoprazole 60 mg QD and ranitidine 300 mg QN as managed by GI with digestive health associate. She reports GERD symptoms are well controlled on these medications. She denies side effects from these medications. She denies a decreased appetite. She does report a frequent cough that she attributes to GERD. She reports a normal previous endoscopy with GI a few years ago. Records requested from GI. She denies a history of stomach cancer.    Overactive bladder  Chronic. Patient reports she has been taking oxybutynin 5 mg QN for the past 3 years due to urinary urgency. She reports symptoms are well controlled with this medication. She denies any side effects from this medication including dizziness or dry mouth.    Vitamin D insufficiency  Chronic. Patient has previous diagnosis of osteopenia and she reports taking magnesium 500 mg QD, calcium 500 mg BID, vitamin D 5000 IU QD, and vitamin E. She denies side effects from these medications. Most recent DEXA was 8/18/18 showed osteopenia with 10-year probability of fracture: major osteoporotic 12.2% and hip 2.4%. I reviewed recent blood work with the patient in clinic today.    Results for XAVIER WOLF (MRN [id number]) as of 8/19/2019 16:37   Ref. Range 2/9/2017 07:47 8/12/2019 07:12   25-Hydroxy   Vitamin D 25 Latest Ref Range: 30 - 100 ng/mL 61 56       Mild episode of recurrent major depressive disorder (HCC)  Patient reports she was diagnosed with depression 5 years ago and is currently taking sertraline 50 mg QD. She reports her symptoms are well controlled on this medication  and she denies side effects. She reports her  has worsening dementia and she is his primary caretaker. They live alone however she works part-time and has good job satisfaction. She denies any thoughts of self-harm or suicidal ideation.      Current medicines (including changes today)  Current Outpatient Medications   Medication Sig Dispense Refill   • atorvastatin (LIPITOR) 20 MG Tab Take 20 mg by mouth every Monday, Wednesday, and Friday.     • SYNTHROID 150 MCG Tab Take 1 Tab by mouth every morning. ON A EMPTY STOMACH 90 Tab 3   • liothyronine (CYTOMEL) 5 MCG Tab Take 1 Tab by mouth every day. 90 Tab 3   • sertraline (ZOLOFT) 50 MG Tab Take 1 Tab by mouth every day. 90 Tab 1   • ezetimibe (ZETIA) 10 MG Tab Take 1 Tab by mouth every day. 90 Tab 1   • traZODone (DESYREL) 50 MG Tab TAKE 1 TABLET BY MOUTH AT  BEDTIME AS NEEDED FOR SLEEP 90 Tab 1   • donepezil (ARICEPT) 5 MG Tab Take 1 Tab by mouth every day. 90 Tab 1   • RESTASIS 0.05 % ophthalmic emulsion      • ranitidine (ZANTAC) 300 MG tablet      • oxybutynin (DITROPAN) 5 MG Tab Take 1 Tab by mouth 3 times a day. 90 Tab 2   • Magnesium 500 MG Tab Take  by mouth.     • valacyclovir (VALTREX) 1 GM Tab Take 1,000 mg by mouth as needed (rash).     • Dexlansoprazole 60 MG CAPSULE DELAYED RELEASE delayed-release capsule Take 60 mg by mouth every day.     • Thiamine HCl (VITAMIN B-1 PO) Take 1 Tab by mouth every day.     • VITAMIN D, CHOLECALCIFEROL, PO Take 5,000 Units by mouth every day.     • VITAMIN E PO Take 1 Tab by mouth every day.     • Misc Natural Products (JOINT SUPPORT PO) Take 2 Tabs by mouth 2 Times a Day.     • Calcium-Magnesium-Vitamin D (CALCIUM 500 PO) Take 1 Tab by mouth 2 Times a Day.       No current facility-administered medications for this visit.      She  has a past medical history of Arthritis, Breast cancer (HCC), Bronchitis, Cancer (HCC) (2000), Cold (11/2017), Disorder of thyroid, Heart burn, Hiatus hernia syndrome, Indigestion,  Pneumonia, Psychiatric problem, Snoring, and Urinary incontinence.  She  has a past surgical history that includes septoplasty (N/A, 1/15/2016); turbinoplasty (Bilateral, 1/15/2016); lumpectomy; mastectomy (2001); pr radiation therapy plan simple; other orthopedic surgery; other; other; pelviscopy (12/29/2017); and laparoscopic bilateral salpingo oopherectomy (Bilateral, 12/29/2017).  Social History     Tobacco Use   • Smoking status: Never Smoker   • Smokeless tobacco: Never Used   Substance Use Topics   • Alcohol use: Yes     Frequency: 2-4 times a month     Drinks per session: 1 or 2     Binge frequency: Never     Comment: 3/month 6/26/19   • Drug use: No     Social History     Social History Narrative   • Not on file     Family History   Problem Relation Age of Onset   • Breast Cancer Mother    • Stroke Mother    • Cancer Father         brain- most likely mets   • Heart Disease Father    • Cancer Sister 55        ovarian cancer - BRCA checked for her and was negative   • Diabetes Brother    • Cancer Brother    • Diabetes Maternal Grandfather    • Cancer Sister 59        brain and breast cancer   • Cancer Brother         prostate cancer     Family Status   Relation Name Status   • Mo  (Not Specified)   • Fa  (Not Specified)   • Sis  (Not Specified)   • Bro  (Not Specified)   • MGFa  (Not Specified)   • Sis  (Not Specified)     Health Maintenance Topics with due status: Overdue       Topic Date Due    PAP SMEAR 08/05/1973    COLONOSCOPY 08/05/2002    IMM ZOSTER VACCINES 08/05/2002    MAMMOGRAM 08/17/2019         ROS    Gen.: Denied weight change, appetite change, fatigue.  ENT: Denied sinus tenderness, nasal congestion, runny nose, or sore throat  CVS: Denied chest pain, palpitations, legs swelling.  Respiratory: Denied cough, shortness of breath, wheezing.  GI: Denied abdominal pain, constipation or diarrhea.  Endocrine: Positive for urgency. Denied temperature intolerance, increased frequency of urination,  "polyphagia or polydipsia.  Musculoskeletal: Denied back pain or joint pain.    All other systems reviewed and are negative     Objective:     /54 (BP Location: Left arm, Patient Position: Sitting, BP Cuff Size: Adult)   Pulse 71   Temp 37.1 °C (98.7 °F) (Temporal)   Ht 1.702 m (5' 7\")   Wt 86.4 kg (190 lb 6.4 oz)   SpO2 95%  Body mass index is 29.82 kg/m².  Physical Exam:    Constitutional: Well nourished and Well developed, Alert, no distress.  Skin: Warm, dry, good turgor, no rashes in visible areas.  Eye: Equal, round and reactive, conjunctiva clear, lids normal.  ENMT: Lips without lesions, good dentition, oropharynx clear.  Neck: Trachea midline, no masses, no thyromegaly. No cervical or supraclavicular lymphadenopathy.  Respiratory: Unlabored respiratory effort, lungs clear to auscultation, no wheezes, no ronchi.  Cardiovascular: Normal S1, S2, no murmur, no edema. No carotid bruits.  Abdomen: Soft, non distended, non-tender, no masses, no hepatosplenomegaly. Bowel sound normal.  Extremities: No edema, no clubbing, no cyanosis.  Psych: Alert and oriented x3, normal affect and mood.  Musculoskeletal exam: moving all extremities freely        Assessment and Plan:   The following treatment plan was discussed:    1. Dyslipidemia  - Patient advised to continue ezetimibe 10 mg QD.   - She was encouraged to increase atorvastatin 20 mg from 2x weekly to 3x weekly, which she was agreeable to. We discussed that if lower extremity myalgias return, we may decrease atorvastatin dose.  - Advised to eat low fat, low carbohydrate and high fiber diet as well as do cardio physical exercise regularly.   - Plan to recheck with blood work.   - Comp Metabolic Panel; Future  - Lipid Profile; Future  - atorvastatin (LIPITOR) 20 MG Tab; Take 20 mg by mouth every Monday, Wednesday, and Friday.    2. Hypothyroidism, unspecified type  - Patient advised to continue synthroid 150 mcg on an empty stomach every morning. She has " previously trialed and failed generic synthroid.   - She may continue cytomel 5 mg every afternoon when most fatigued. Plan to continue to monitor for side effects, due to high dose.  - Plan to monitor with blood work.  - SYNTHROID 150 MCG Tab; Take 1 Tab by mouth every morning. ON A EMPTY STOMACH  Dispense: 90 Tab; Refill: 3  - liothyronine (CYTOMEL) 5 MCG Tab; Take 1 Tab by mouth every day.  Dispense: 90 Tab; Refill: 3  - CBC WITH DIFFERENTIAL; Future  - TSH; Future  - FREE THYROXINE; Future    3. Short-term memory loss  - Due to gradually worsening short-term memory loss, patient may continue donepezil 5 mg QD. We reviewed potential side effects of this medication.  - CBC WITH DIFFERENTIAL; Future  - Comp Metabolic Panel; Future    4. Gastroesophageal reflux disease without esophagitis  - Patient reports symptoms well controlled with dexlansoprazole 60 mg QD and ranitidine 300 mg QN and she was advised to continue these medications.   - Discussed to avoid spicy food, acidic food, excessive caffeine and alcohol.  - She should continue following GI for endoscopy she has worsening symptoms of acid reflux.  Last colonoscopy was done by digestive health associate and we will request record of office visit note, colonoscopy and EGD from GI.    5. Overactive bladder  - Patient advised to continue oxybutynin 5 mg QN for urinary urgency.  Reviewed potential side effects of oxybutynin with patient.  Continue to monitor.    6. Vitamin D insufficiency  - Patient with history of osteopenia and Vitamin D insufficiency. Patient may continue magnesium 500 mg QD, calcium 500 mg BID, vitamin D 5000 IU QD, and vitamin E PO.   - Plan to recheck with blood work.  - VITAMIN D,25 HYDROXY; Future    7. Mild episode of recurrent major depressive disorder (HCC)   - Well-controlled. Continue current regimens, Zoloft 50 mg daily. Recheck lab 1-2 weeks before next follow up visit.  - Discussed with patient regarding the use and side effects  of medication as well as black box warning of suicidality risk with medication. Patient verbally understands. Recommend to call 911 or go to ER if patient has suicidal ideation or plan.    8. Visit for screening mammogram  - Patient with history of breast cancer, mastectomy, and breast implants. She is overdue for a mammogram and orders were placed today.   - MA-SCREEN MAMMO W/CAD-BILAT; Future    9. Health Maintenance:  - Due for mammogram, see above (#6).  - Due for pap smear, which she elected to postpone. History of elective laparoscopic bilateral salpingo oophorectomy.  - Due for colonoscopy, which she elected to postpone. No family history of colon cancer.  - Due for Zoster vaccines, which she elected to postpone.      Records requested.  Follow up: Return in about 3 months (around 11/19/2019), or if symptoms worsen or fail to improve, for Hyperlipidemia, Hypothyroid, GERD, Vitamin D insufficiency, Lab review. sooner should new symptoms or problems arise.    I, Keyanna Huynh (Selena), am scribing for, and in the presence of, Pina Conway M.D..  ?  Electronically signed by: Keyanna Huynh (Selena), 8/19/2019  ?  I, Pina Conway M.D., personally performed the services described in this documentation, as scribed by Keyanna Huynh in my presence, and it is both accurate and complete.    Please note that this dictation was created using voice recognition software. I have made every reasonable attempt to correct obvious errors, but I expect that there may have unintended errors in text, spelling, punctuation, or grammar that I did not discover.

## 2019-08-23 ENCOUNTER — HOSPITAL ENCOUNTER (OUTPATIENT)
Dept: RADIOLOGY | Facility: MEDICAL CENTER | Age: 69
End: 2019-08-23
Attending: INTERNAL MEDICINE
Payer: COMMERCIAL

## 2019-08-23 DIAGNOSIS — Z12.31 VISIT FOR SCREENING MAMMOGRAM: ICD-10-CM

## 2019-08-23 PROCEDURE — 77063 BREAST TOMOSYNTHESIS BI: CPT

## 2019-11-18 ENCOUNTER — HOSPITAL ENCOUNTER (OUTPATIENT)
Dept: LAB | Facility: MEDICAL CENTER | Age: 69
End: 2019-11-18
Attending: INTERNAL MEDICINE
Payer: COMMERCIAL

## 2019-11-18 DIAGNOSIS — E78.5 DYSLIPIDEMIA: ICD-10-CM

## 2019-11-18 DIAGNOSIS — R41.3 SHORT-TERM MEMORY LOSS: ICD-10-CM

## 2019-11-18 DIAGNOSIS — E55.9 VITAMIN D INSUFFICIENCY: ICD-10-CM

## 2019-11-18 DIAGNOSIS — E03.9 ACQUIRED HYPOTHYROIDISM: ICD-10-CM

## 2019-11-18 LAB
BASOPHILS # BLD AUTO: 0.6 % (ref 0–1.8)
BASOPHILS # BLD: 0.04 K/UL (ref 0–0.12)
EOSINOPHIL # BLD AUTO: 0.1 K/UL (ref 0–0.51)
EOSINOPHIL NFR BLD: 1.5 % (ref 0–6.9)
ERYTHROCYTE [DISTWIDTH] IN BLOOD BY AUTOMATED COUNT: 51.2 FL (ref 35.9–50)
HCT VFR BLD AUTO: 40.2 % (ref 37–47)
HGB BLD-MCNC: 12.6 G/DL (ref 12–16)
IMM GRANULOCYTES # BLD AUTO: 0.01 K/UL (ref 0–0.11)
IMM GRANULOCYTES NFR BLD AUTO: 0.2 % (ref 0–0.9)
LYMPHOCYTES # BLD AUTO: 1.75 K/UL (ref 1–4.8)
LYMPHOCYTES NFR BLD: 26.6 % (ref 22–41)
MCH RBC QN AUTO: 31.6 PG (ref 27–33)
MCHC RBC AUTO-ENTMCNC: 31.3 G/DL (ref 33.6–35)
MCV RBC AUTO: 100.8 FL (ref 81.4–97.8)
MONOCYTES # BLD AUTO: 0.57 K/UL (ref 0–0.85)
MONOCYTES NFR BLD AUTO: 8.6 % (ref 0–13.4)
NEUTROPHILS # BLD AUTO: 4.12 K/UL (ref 2–7.15)
NEUTROPHILS NFR BLD: 62.5 % (ref 44–72)
NRBC # BLD AUTO: 0 K/UL
NRBC BLD-RTO: 0 /100 WBC
PLATELET # BLD AUTO: 333 K/UL (ref 164–446)
PMV BLD AUTO: 9.9 FL (ref 9–12.9)
RBC # BLD AUTO: 3.99 M/UL (ref 4.2–5.4)
WBC # BLD AUTO: 6.6 K/UL (ref 4.8–10.8)

## 2019-11-18 PROCEDURE — 84443 ASSAY THYROID STIM HORMONE: CPT

## 2019-11-18 PROCEDURE — 80053 COMPREHEN METABOLIC PANEL: CPT

## 2019-11-18 PROCEDURE — 36415 COLL VENOUS BLD VENIPUNCTURE: CPT

## 2019-11-18 PROCEDURE — 84439 ASSAY OF FREE THYROXINE: CPT

## 2019-11-18 PROCEDURE — 82306 VITAMIN D 25 HYDROXY: CPT

## 2019-11-18 PROCEDURE — 85025 COMPLETE CBC W/AUTO DIFF WBC: CPT

## 2019-11-18 PROCEDURE — 80061 LIPID PANEL: CPT

## 2019-11-19 ENCOUNTER — OFFICE VISIT (OUTPATIENT)
Dept: MEDICAL GROUP | Age: 69
End: 2019-11-19
Payer: COMMERCIAL

## 2019-11-19 VITALS
BODY MASS INDEX: 29.63 KG/M2 | WEIGHT: 188.8 LBS | OXYGEN SATURATION: 92 % | HEIGHT: 67 IN | HEART RATE: 72 BPM | SYSTOLIC BLOOD PRESSURE: 98 MMHG | DIASTOLIC BLOOD PRESSURE: 52 MMHG | TEMPERATURE: 98 F

## 2019-11-19 DIAGNOSIS — E03.9 ACQUIRED HYPOTHYROIDISM: ICD-10-CM

## 2019-11-19 DIAGNOSIS — R05.3 CHRONIC COUGH: ICD-10-CM

## 2019-11-19 DIAGNOSIS — E78.5 DYSLIPIDEMIA: ICD-10-CM

## 2019-11-19 DIAGNOSIS — R41.3 SHORT-TERM MEMORY LOSS: ICD-10-CM

## 2019-11-19 DIAGNOSIS — Z78.9 STATIN INTOLERANCE: ICD-10-CM

## 2019-11-19 DIAGNOSIS — F33.0 MILD EPISODE OF RECURRENT MAJOR DEPRESSIVE DISORDER (HCC): ICD-10-CM

## 2019-11-19 LAB
25(OH)D3 SERPL-MCNC: 50 NG/ML (ref 30–100)
ALBUMIN SERPL BCP-MCNC: 4.3 G/DL (ref 3.2–4.9)
ALBUMIN/GLOB SERPL: 1.7 G/DL
ALP SERPL-CCNC: 75 U/L (ref 30–99)
ALT SERPL-CCNC: 16 U/L (ref 2–50)
ANION GAP SERPL CALC-SCNC: 6 MMOL/L (ref 0–11.9)
AST SERPL-CCNC: 17 U/L (ref 12–45)
BILIRUB SERPL-MCNC: 0.4 MG/DL (ref 0.1–1.5)
BUN SERPL-MCNC: 20 MG/DL (ref 8–22)
CALCIUM SERPL-MCNC: 9.5 MG/DL (ref 8.5–10.5)
CHLORIDE SERPL-SCNC: 107 MMOL/L (ref 96–112)
CHOLEST SERPL-MCNC: 245 MG/DL (ref 100–199)
CO2 SERPL-SCNC: 25 MMOL/L (ref 20–33)
CREAT SERPL-MCNC: 0.93 MG/DL (ref 0.5–1.4)
FASTING STATUS PATIENT QL REPORTED: NORMAL
GLOBULIN SER CALC-MCNC: 2.5 G/DL (ref 1.9–3.5)
GLUCOSE SERPL-MCNC: 82 MG/DL (ref 65–99)
HDLC SERPL-MCNC: 48 MG/DL
LDLC SERPL CALC-MCNC: 170 MG/DL
POTASSIUM SERPL-SCNC: 4.2 MMOL/L (ref 3.6–5.5)
PROT SERPL-MCNC: 6.8 G/DL (ref 6–8.2)
SODIUM SERPL-SCNC: 138 MMOL/L (ref 135–145)
T4 FREE SERPL-MCNC: 1.39 NG/DL (ref 0.53–1.43)
TRIGL SERPL-MCNC: 133 MG/DL (ref 0–149)
TSH SERPL DL<=0.005 MIU/L-ACNC: 3.5 UIU/ML (ref 0.38–5.33)

## 2019-11-19 PROCEDURE — 99214 OFFICE O/P EST MOD 30 MIN: CPT | Performed by: INTERNAL MEDICINE

## 2019-11-19 RX ORDER — EZETIMIBE 10 MG/1
10 TABLET ORAL DAILY
Qty: 90 TAB | Refills: 1 | Status: SHIPPED | OUTPATIENT
Start: 2019-11-19 | End: 2020-02-24 | Stop reason: SINTOL

## 2019-11-19 RX ORDER — DONEPEZIL HYDROCHLORIDE 5 MG/1
5 TABLET, FILM COATED ORAL DAILY
Qty: 90 TAB | Refills: 1 | Status: SHIPPED | OUTPATIENT
Start: 2019-11-19 | End: 2020-02-24 | Stop reason: SDUPTHER

## 2019-11-19 ASSESSMENT — PAIN SCALES - GENERAL: PAINLEVEL: NO PAIN

## 2019-11-19 NOTE — PROGRESS NOTES
Subjective:   Lang Estrella is a 69 y.o. female here today for evaluation and management of:    Acquired hypothyroidism  Chronic. Patient reports compliancy with synthroid 150 mcg every morning on an empty stomach and cytomel 5 mg every afternoon and denies any associated side effects. She was previously unable to tolerate generic synthroid. Today we reviewed blood work from 11/18/19 which indicated normal TSH (3.500) and Free T-4 (1.39). Patient denies any symptoms of hypo- or hyperthyroidism.    I reviewed recent blood work with the patient in clinic today.   Ref. Range 8/12/2019 07:12 11/18/2019 12:52   TSH Latest Ref Range: 0.380 - 5.330 uIU/mL 4.930 3.500   Free T-4 Latest Ref Range: 0.53 - 1.43 ng/dL 1.91 (H) 1.39     Dyslipidemia  Statin intolerance  Chronic. Patient reports non-compliancy with ezetimibe 10 mg QD and atorvastatin 20 mg 2x weekly, having completely discontinued these medications. She reports she discontinued ezetimibe due to difficulty obtaining the medication at her pharmacy, this medication was previously initiated by her prior PCP at [facility]. She was taking atorvastatin bi-weekly due to previous intolerance to statins with lower extremity myalgias. She reports previous trials of up to 6 different statins. She reports that recently she discontinued atorvastatin due to lower extremity myalgias.     Today we reviewed blood work from 11/18/19 which indicated elevated lipid panel with tot 245; tri 133; HDL 48; . The 10-year ASCVD risk score ([place] DC Jr., et al., 2013) is: 4.7%. No smoking history or history of diabetes. Patient denies any chest pain or claudication.    Today patient is agreeable to continuing ezetimibe 10 mg QD and attempting stricter diet and exercise control.    I reviewed recent blood work with the patient in clinic today.   Ref. Range 4/9/2019 10:20 8/12/2019 07:12 11/18/2019 12:52   Cholesterol,Tot Latest Ref Range: 100 - 199 mg/dL 236 (H) 156 245 (H)    Triglycerides Latest Ref Range: 0 - 149 mg/dL 88 84 133   HDL Latest Ref Range: >=40 mg/dL 67 58 48   LDL Latest Ref Range: <100 mg/dL 151 (H) 81 170 (H)     Mild episode of recurrent major depressive disorder (HCC)  Chronic. Patient reports compliancy with sertraline 50 mg QD and denies any associated side effects. She reports her mood is managed well on the current regimen. She reports her  has worsening dementia and she is his primary caretaker. They live alone however she works part-time and has good job satisfaction. She denies any suicidal ideation, plan, or self harm.    Short-term memory loss  Chronic. Patient reports compliancy with donepezil 5 mg QD and vitamin B complex QD and denies any associated side effects. She reports her short term memory has gradually been worsening for the past several years. She denies a personal history of dementia however does report dementia in her mother.    Chronic cough  Chronic. Patient reports ongoing chronic, dry cough for several years. She does currently use dexlansoprazole 60 mg QD and ranitidine 300 mg QD for acid reduction as prescribed by GI at [facility].  She also has chronic sinus problems and have sinus surgery by Dr. Kinney, ENT.  She states that her ENT prescribed mupirocin ointment topical to treat her swelling and inflammation of nasal mucosa.  She stated that might has postnasal drip as well.  She denies any chest congestion, nasal congestion, or postnasal drip. No associated fever, chills, nausea, or vomiting.     Current medicines (including changes today)  Current Outpatient Medications   Medication Sig Dispense Refill   • mupirocin (BACTROBAN) 2 % Ointment APPLY TOPICALLY TO AFFECTED AREA(S) TWICE DAILY AS NEEDED  2   • ezetimibe (ZETIA) 10 MG Tab Take 1 Tab by mouth every day. 90 Tab 1   • sertraline (ZOLOFT) 50 MG Tab Take 1 Tab by mouth every day. 90 Tab 1   • donepezil (ARICEPT) 5 MG Tab Take 1 Tab by mouth every day. 90 Tab 1   •  "SYNTHROID 150 MCG Tab Take 1 Tab by mouth every morning. ON A EMPTY STOMACH 90 Tab 3   • liothyronine (CYTOMEL) 5 MCG Tab Take 1 Tab by mouth every day. 90 Tab 3   • traZODone (DESYREL) 50 MG Tab TAKE 1 TABLET BY MOUTH AT  BEDTIME AS NEEDED FOR SLEEP 90 Tab 1   • RESTASIS 0.05 % ophthalmic emulsion      • ranitidine (ZANTAC) 300 MG tablet      • oxybutynin (DITROPAN) 5 MG Tab Take 1 Tab by mouth 3 times a day. 90 Tab 2   • Magnesium 500 MG Tab Take  by mouth.     • valacyclovir (VALTREX) 1 GM Tab Take 1,000 mg by mouth as needed (rash).     • Dexlansoprazole 60 MG CAPSULE DELAYED RELEASE delayed-release capsule Take 60 mg by mouth every day.     • Thiamine HCl (VITAMIN B-1 PO) Take 1 Tab by mouth every day.     • VITAMIN D, CHOLECALCIFEROL, PO Take 5,000 Units by mouth every day.     • VITAMIN E PO Take 1 Tab by mouth every day.     • Misc Natural Products (JOINT SUPPORT PO) Take 2 Tabs by mouth 2 Times a Day.     • Calcium-Magnesium-Vitamin D (CALCIUM 500 PO) Take 1 Tab by mouth 2 Times a Day.       No current facility-administered medications for this visit.      She  has a past medical history of Arthritis, Breast cancer (HCC), Bronchitis, Cancer (HCC) (2000), Cold (11/2017), Disorder of thyroid, Heart burn, Hiatus hernia syndrome, Indigestion, Pneumonia, Psychiatric problem, Snoring, and Urinary incontinence.    ROS   No chest pain, no shortness of breath, no abdominal pain       Objective:     BP (!) 98/52 (BP Location: Left arm, Patient Position: Sitting, BP Cuff Size: Adult)   Pulse 72   Temp 36.7 °C (98 °F) (Temporal)   Ht 1.702 m (5' 7\")   Wt 85.6 kg (188 lb 12.8 oz)   SpO2 92%  Body mass index is 29.57 kg/m².   Physical Exam:  General: Alert, oriented and no acute distress.  Eye contact is good, speech goal directed, affect calm  HEENT: conjunctiva non-injected, sclera non-icteric. Mild erythema and swelling on nasal turbinate of the right nasal cavity.  Oral mucous membranes pink and moist with no " lesions.    Pinna normal.   Lungs: Normal respiratory effort, clear to auscultation bilaterally with good excursion.  CV: regular rate and rhythm. No murmurs.   Abdomen: soft, non distended, nontender, Bowel sound normal.  Ext: no edema, color normal, vascularity normal, temperature normal  Musculoskeletal exam: moving all extremities freely      Assessment and Plan:   The following treatment plan was discussed:    1. Acquired hypothyroidism  - Patient advised to continue synthroid 150 mcg on an empty stomach every morning. She has previously trialed and failed generic synthroid. I reviewed potential risks, benefits, and side effects of this medication with the patient in clinic today.  - She may continue cytomel 5 mg every afternoon when most fatigued. Plan to continue to monitor for side effects, due to high dose. I reviewed potential risks, benefits, and side effects of this medication with the patient in clinic today.  - Plan to continue to monitor with blood work, which will be ordered and reviewed by their new PCP.    2. Dyslipidemia  - Not at goal. Her lipid panel was elevated on 11/18/19 with tot 245; tri 133; HDL 48; . The 10-year ASCVD risk score ([place] DC Jr., et al., 2013) is: 4.7%.  - She recently discontinued ezetimibe 10 mg QD due to difficulty obtaining from her pharmacy. At this time she is agreeable to re-initiation on this medication. I reviewed potential risks, benefits, and side effects of this medication with the patient in clinic today. Refill provided today.  - She's recently completely discontinued atorvastatin 20 mg 3x weekly due to lower extremity myalgias. Patient reports previous trials of several statins (up to 6) that she was unable to tolerate due to lower extremity myalgias. Today we had lengthy discussion regarding treatment plan, and I have suggested referral to vascular medicine which she declines at this time.  - Today patient is advised on compliancy with ezetimibe 10 mg  QD in addition to attempting to control with balanced diet and regular cardio exercise. Advised to eat low fat, low carbohydrate and high fiber diet as well as do cardio physical exercise regularly.   - Plan to continue to monitor with blood work, which will be ordered and reviewed by their new PCP.  - ezetimibe (ZETIA) 10 MG Tab; Take 1 Tab by mouth every day.  Dispense: 90 Tab; Refill: 1    3. Statin intolerance  - See above (#2).  - ezetimibe (ZETIA) 10 MG Tab; Take 1 Tab by mouth every day.  Dispense: 90 Tab; Refill: 1    4. Mild episode of recurrent major depressive disorder (HCC)  - Well-controlled. Continue current regimens, sertraline 50 mg daily. I reviewed potential risks, benefits, and side effects of this medication with the patient in clinic today.  - Discussed with patient regarding the use and side effects of medication as well as black box warning of suicidality risk with medication. Patient verbally understands. Recommend to call 911 or go to ER if patient has suicidal ideation or plan.   - sertraline (ZOLOFT) 50 MG Tab; Take 1 Tab by mouth every day.  Dispense: 90 Tab; Refill: 1    5. Short-term memory loss  - Well-controlled. Continue current regimen, donepezil 5 mg QD. I reviewed potential risks, benefits, and side effects of this medication with the patient in clinic today.  - donepezil (ARICEPT) 5 MG Tab; Take 1 Tab by mouth every day.  Dispense: 90 Tab; Refill: 1    6. Chronic cough  - Patient reports chronic dry cough for several years.  I discussed possible causes of chronic cough with patient.  Patient declined to do chest x-ray at this point time.  - Patient should discuss with her GI specialist regarding ongoing use of dexlansoprazole 60 mg QD and ranitidine 300 mg QD at upcoming appointment in 1 month.  - Discussed at length to rinse sinuses with over the counter nasal wash or Netti pot once or twice a day and then use flonase nasal spray once or twice a day after rinsing sinuses  -  Advised to try nasal steam therapy. Discussed with patient how to do nasal steam therapy. She is additionally advised on warm salt water gargles.  - Patient is advised to follow with ENT for her sinus problem.    7. Health Maintenance   - Patient reports pap smear was completed with Dr. Haydee Ford (GYN) this year and was normal, records requested.  - Patient reports colonoscopy was completed with [facility] in 2018 and was normal. Records requested.  - Patient was advised to inquire about the shingles vaccine at their local pharmacy due to clinic shortage. I reviewed the potential risks, benefits, and side effects with the patient.      Follow up: Return in about 3 months (around 2/19/2020), or if symptoms worsen or fail to improve, for Hypothyroid, Hyperlipidemia, Depression, short-term memory loss, chronic cough.    I, Keyanna Huynh (Scribe), am scribing for, and in the presence of, Pina Conway M.D.. Electronically signed by: Keyanna Huynh (Tomaibe), 11/19/2019.  ?  I, Pina Conway M.D., personally performed the services described in this documentation, as scribed by Keyanna Huynh in my presence, and it is both accurate and complete.    Please note that this dictation was created using voice recognition software. I have made every reasonable attempt to correct obvious errors, but I expect that there may have unintended errors in text, spelling, punctuation, or grammar that I did not discover.

## 2019-11-19 NOTE — LETTER
[facility]  Pina Conway M.D.  [address]  [place] [address]  Fax: [phone]   Authorization for Release/Disclosure of   Protected Health Information   Name: XAVIER ESTRELLA : 1950 SSN: [id number]   Address: [address] [place] [address] [place] [address] Phone:    [phone] (home)    I authorize the entity listed below to release/disclose the PHI below to:   [facility]/Pina Conway M.D. and Pina Conway M.D.   Provider or Entity Name:  Dr. Haydee River   Address   City, [facility]   Phone:      Fax:     Reason for request: continuity of care   Information to be released:    [  ] LAST COLONOSCOPY,  including any PATH REPORT and follow-up  [  ] LAST FIT/COLOGUARD RESULT [  ] LAST DEXA  [  ] LAST MAMMOGRAM  [XXX] LAST PAP  [  ] LAST LABS [  ] RETINA EXAM REPORT  [  ] IMMUNIZATION RECORDS  [  ] Release all info      [  ] Check here and initial the line next to each item to release ALL health information INCLUDING  _____ Care and treatment for drug and / or alcohol abuse  _____ HIV testing, infection status, or AIDS  _____ Genetic Testing    DATES OF SERVICE OR TIME PERIOD TO BE DISCLOSED: _____________  I understand and acknowledge that:  * This Authorization may be revoked at any time by you in writing, except if your health information has already been used or disclosed.  * Your health information that will be used or disclosed as a result of you signing this authorization could be re-disclosed by the recipient. If this occurs, your re-disclosed health information may no longer be protected by State or Federal laws.  * You may refuse to sign this Authorization. Your refusal will not affect your ability to obtain treatment.  * This Authorization becomes effective upon signing and will  on (date) __________.      If no date is indicated, this Authorization will  one (1) year from the signature date.    Name: Xavier Estrella    Signature:   Date:     2019       PLEASE FAX  REQUESTED RECORDS BACK TO: [phone]

## 2019-11-19 NOTE — LETTER
[facility]  Pina Conway M.D.  [address]  [place] [address]  Fax: [phone]   Authorization for Release/Disclosure of   Protected Health Information   Name: XAVIER ESTRELLA : 1950 SSN: [id number]   Address: [address] [place] [address] [place] [address] Phone:    [phone] (home)    I authorize the entity listed below to release/disclose the PHI below to:   [facility]/Pina Conway M.D. and Pina Conway M.D.   Provider or Entity Name:  [facility]   Address   City, State, Zip   Phone:      Fax:     Reason for request: continuity of care   Information to be released:    [XXX] LAST COLONOSCOPY,  including any PATH REPORT and follow-up  [  ] LAST FIT/COLOGUARD RESULT [  ] LAST DEXA  [  ] LAST MAMMOGRAM  [  ] LAST PAP  [  ] LAST LABS [  ] RETINA EXAM REPORT  [  ] IMMUNIZATION RECORDS  [  ] Release all info      [  ] Check here and initial the line next to each item to release ALL health information INCLUDING  _____ Care and treatment for drug and / or alcohol abuse  _____ HIV testing, infection status, or AIDS  _____ Genetic Testing    DATES OF SERVICE OR TIME PERIOD TO BE DISCLOSED: _____________  I understand and acknowledge that:  * This Authorization may be revoked at any time by you in writing, except if your health information has already been used or disclosed.  * Your health information that will be used or disclosed as a result of you signing this authorization could be re-disclosed by the recipient. If this occurs, your re-disclosed health information may no longer be protected by State or Federal laws.  * You may refuse to sign this Authorization. Your refusal will not affect your ability to obtain treatment.  * This Authorization becomes effective upon signing and will  on (date) __________.      If no date is indicated, this Authorization will  one (1) year from the signature date.    Name: Xavier Estrella    Signature:   Date:     2019       PLEASE FAX  REQUESTED RECORDS BACK TO: [phone]

## 2020-02-24 ENCOUNTER — OFFICE VISIT (OUTPATIENT)
Dept: MEDICAL GROUP | Age: 70
End: 2020-02-24
Payer: COMMERCIAL

## 2020-02-24 VITALS
WEIGHT: 193 LBS | TEMPERATURE: 97.4 F | DIASTOLIC BLOOD PRESSURE: 70 MMHG | HEART RATE: 78 BPM | SYSTOLIC BLOOD PRESSURE: 108 MMHG | RESPIRATION RATE: 16 BRPM | BODY MASS INDEX: 30.29 KG/M2 | OXYGEN SATURATION: 93 % | HEIGHT: 67 IN

## 2020-02-24 DIAGNOSIS — F33.0 MILD EPISODE OF RECURRENT MAJOR DEPRESSIVE DISORDER (HCC): ICD-10-CM

## 2020-02-24 DIAGNOSIS — E55.9 VITAMIN D INSUFFICIENCY: ICD-10-CM

## 2020-02-24 DIAGNOSIS — E78.5 DYSLIPIDEMIA: ICD-10-CM

## 2020-02-24 DIAGNOSIS — E53.8 VITAMIN B 12 DEFICIENCY: ICD-10-CM

## 2020-02-24 DIAGNOSIS — E03.9 ACQUIRED HYPOTHYROIDISM: ICD-10-CM

## 2020-02-24 DIAGNOSIS — Z23 NEED FOR VACCINATION: ICD-10-CM

## 2020-02-24 DIAGNOSIS — R41.3 SHORT-TERM MEMORY LOSS: ICD-10-CM

## 2020-02-24 DIAGNOSIS — N32.81 OVERACTIVE BLADDER: ICD-10-CM

## 2020-02-24 DIAGNOSIS — M85.80 OSTEOPENIA, UNSPECIFIED LOCATION: ICD-10-CM

## 2020-02-24 DIAGNOSIS — F41.9 ANXIETY: ICD-10-CM

## 2020-02-24 DIAGNOSIS — K21.9 GASTROESOPHAGEAL REFLUX DISEASE, ESOPHAGITIS PRESENCE NOT SPECIFIED: ICD-10-CM

## 2020-02-24 PROCEDURE — 99214 OFFICE O/P EST MOD 30 MIN: CPT | Performed by: INTERNAL MEDICINE

## 2020-02-24 RX ORDER — LIOTHYRONINE SODIUM 5 UG/1
5 TABLET ORAL 2 TIMES DAILY
Qty: 180 TAB | Refills: 4 | Status: SHIPPED | OUTPATIENT
Start: 2020-02-24 | End: 2020-06-08 | Stop reason: SDUPTHER

## 2020-02-24 RX ORDER — DONEPEZIL HYDROCHLORIDE 5 MG/1
5 TABLET, FILM COATED ORAL DAILY
Qty: 90 TAB | Refills: 1 | Status: SHIPPED | OUTPATIENT
Start: 2020-02-24 | End: 2020-07-14

## 2020-02-24 RX ORDER — ROSUVASTATIN CALCIUM 5 MG/1
5 TABLET, COATED ORAL EVERY EVENING
Qty: 90 TAB | Refills: 4 | Status: SHIPPED | OUTPATIENT
Start: 2020-02-24 | End: 2020-07-16 | Stop reason: SDUPTHER

## 2020-02-24 SDOH — HEALTH STABILITY: MENTAL HEALTH: HOW OFTEN DO YOU HAVE A DRINK CONTAINING ALCOHOL?: MONTHLY OR LESS

## 2020-02-24 NOTE — PROGRESS NOTES
Subjective:      Lang Estrella is a 69 y.o. female who presents with [facility]        HPI    The patient is here to establish care and for followup of chronic medical problems listed below. The patient is compliant with medications and having no side effects from them. Denies chest pain, abdominal pain, dyspnea, myalgias, or cough.    She is a former patient of Dr. Zoraida M.D..    Acquired hypothyroidism  Patient continues to take thyroid replacement medication as prescribed. She has been well controlled on synthroid 150 mcg and liothyronine 5 mg. She recently had her cytomel dose decreased, however the patient reports associated hunger and fatigue.    Dyslipidemia  During her last office visit on 11/19/2019, she agreed to restart ezetimibe 10 mg to manage her dyslipidemia however without great results in the past. She was also agreeable to diet modification and exercise regulation. She has a history of statin intolerance.     Gastroesophageal reflux disease, esophagitis presence not specified  She continues to take dexlansoprazole 60 mg and is managed by her GI doctor.    Anxiety  Mild episode of recurrent major depressive disorder (HCC)  Chronic history. Patient continues to take sertraline 50 mg daily to manage her depression.    Vitamin D insufficiency  She takes 5000 IU's of  cholecalciferol PO supplementation.     Short-term memory loss  Known history. She continues to take donepezil 5 mg as a preventative measure for short-term memory loss. She has been taking this prophylactically and denies symptoms of nausea.         Patient Active Problem List   Diagnosis   • Mild episode of recurrent major depressive disorder (HCC)   • Anxiety   • GERD (gastroesophageal reflux disease)   • Acquired hypothyroidism   • Personal history of breast cancer   • Family history of breast cancer   • Herpes zoster without complication   • Dyslipidemia   • Health care maintenance   • Osteopenia   • Short-term memory loss   •  "Overactive bladder   • Vitamin D insufficiency   • Chronic cough       Outpatient Medications Prior to Visit   Medication Sig Dispense Refill   • mupirocin (BACTROBAN) 2 % Ointment APPLY TOPICALLY TO AFFECTED AREA(S) TWICE DAILY AS NEEDED  2   • SYNTHROID 150 MCG Tab Take 1 Tab by mouth every morning. ON A EMPTY STOMACH 90 Tab 3   • traZODone (DESYREL) 50 MG Tab TAKE 1 TABLET BY MOUTH AT  BEDTIME AS NEEDED FOR SLEEP 90 Tab 1   • RESTASIS 0.05 % ophthalmic emulsion      • ranitidine (ZANTAC) 300 MG tablet      • oxybutynin (DITROPAN) 5 MG Tab Take 1 Tab by mouth 3 times a day. 90 Tab 2   • Magnesium 500 MG Tab Take  by mouth.     • valacyclovir (VALTREX) 1 GM Tab Take 1,000 mg by mouth as needed (rash).     • Dexlansoprazole 60 MG CAPSULE DELAYED RELEASE delayed-release capsule Take 60 mg by mouth every day.     • Thiamine HCl (VITAMIN B-1 PO) Take 1 Tab by mouth every day.     • VITAMIN D, CHOLECALCIFEROL, PO Take 5,000 Units by mouth every day.     • VITAMIN E PO Take 1 Tab by mouth every day.     • Misc Natural Products (JOINT SUPPORT PO) Take 2 Tabs by mouth 2 Times a Day.     • Calcium-Magnesium-Vitamin D (CALCIUM 500 PO) Take 1 Tab by mouth 2 Times a Day.     • ezetimibe (ZETIA) 10 MG Tab Take 1 Tab by mouth every day. 90 Tab 1   • sertraline (ZOLOFT) 50 MG Tab Take 1 Tab by mouth every day. 90 Tab 1   • donepezil (ARICEPT) 5 MG Tab Take 1 Tab by mouth every day. 90 Tab 1   • liothyronine (CYTOMEL) 5 MCG Tab Take 1 Tab by mouth every day. 90 Tab 3     No facility-administered medications prior to visit.         Allergies   Allergen Reactions   • Levaquin Unspecified     Cramping of legs   • Statins [Hmg-Coa-R Inhibitors]      Looks up her legs       Review of Systems   All other systems reviewed and are negative.       Objective:     /70 (BP Location: Left arm, Patient Position: Sitting, BP Cuff Size: Large adult)   Pulse 78   Temp 36.3 °C (97.4 °F) (Temporal)   Resp 16   Ht 1.702 m (5' 7\")   Wt 87.5 " kg (193 lb)   SpO2 93%   BMI 30.23 kg/m²     Physical Exam   Constitutional: Oriented to person, place, and time. Appears well-developed and well-nourished. No distress.   Head: Normocephalic and atraumatic.   Right Ear: External ear normal.   Left Ear: External ear normal.   Nose: Nose normal.   Mouth/Throat: Oropharynx is clear and moist. No oropharyngeal exudate.   Eyes: Pupils are equal, round, and reactive to light. Conjunctivae and EOM are normal. Right eye exhibits no discharge. Left eye exhibits no discharge. No scleral icterus.   Neck: Normal range of motion. Neck supple. No JVD present. No tracheal deviation present. No thyromegaly present.   Cardiovascular: Normal rate, regular rhythm, normal heart sounds and intact distal pulses.  Exam reveals no gallop and no friction rub.    No murmur heard.  Pulmonary/Chest: Effort normal. No stridor. No respiratory distress. No wheezing or rales. No tenderness.   Abdominal: Soft. Bowel sounds are normal. No distension and no mass. There is no tenderness. There is no rebound and no guarding. No hernia.   Musculoskeletal: Normal range of motion No edema or tenderness.   Lymphadenopathy: No cervical adenopathy.   Neurological: Alert and oriented to person, place, and time. Normal reflexes. Normal reflexes. No cranial nerve deficit. Normal muscle tone. Coordination normal.   Skin: Skin is warm and dry. No rash noted. Not diaphoretic. No erythema. No pallor.   Psychiatric: Normal mood and affect. Behavior is normal. Judgment and thought content normal.   Nursing note and vitals reviewed.      No results found for: HBA1C  Lab Results   Component Value Date/Time    SODIUM 138 11/18/2019 12:52 PM    POTASSIUM 4.2 11/18/2019 12:52 PM    CHLORIDE 107 11/18/2019 12:52 PM    CO2 25 11/18/2019 12:52 PM    GLUCOSE 82 11/18/2019 12:52 PM    BUN 20 11/18/2019 12:52 PM    CREATININE 0.93 11/18/2019 12:52 PM    BUNCREATRAT 23 04/09/2019 08:02 AM    ALKPHOSPHAT 75 11/18/2019 12:52 PM     ASTSGOT 17 11/18/2019 12:52 PM    ALTSGPT 16 11/18/2019 12:52 PM    TBILIRUBIN 0.4 11/18/2019 12:52 PM     No results found for: INR  Lab Results   Component Value Date/Time    CHOLSTRLTOT 245 (H) 11/18/2019 12:52 PM     (H) 11/18/2019 12:52 PM    HDL 48 11/18/2019 12:52 PM    TRIGLYCERIDE 133 11/18/2019 12:52 PM       No results found for: TESTOSTERONE  Lab Results   Component Value Date/Time    TSH 1.580 06/21/2019 10:20 AM    TSH 11.950 (H) 04/09/2019 08:02 AM     Lab Results   Component Value Date/Time    FREET4 1.39 11/18/2019 12:52 PM    FREET4 1.91 (H) 08/12/2019 07:12 AM     Lab Results   Component Value Date/Time    URICACID 4.4 02/09/2017 07:47 AM     No components found for: VITB12  Lab Results   Component Value Date/Time    25HYDROXY 50 11/18/2019 12:52 PM    25HYDROXY 56 08/12/2019 07:12 AM          Assessment/Plan:     1. Acquired hypothyroidism  Not well controlled on thyroid replacement medication. She will restart on the liothyronine  To avoid increased fatigue in the afternoons. We will continue the current plan of care. Labs have been ordered for the next follow up visit.   - liothyronine (CYTOMEL) 5 MCG Tab; Take 1 Tab by mouth 2 Times a Day.  Dispense: 180 Tab; Refill: 4  - TSH; Future  - FREE THYROXINE; Future    2. Anxiety  Patient has been stable with current management with sertraline. We will make no changes for now.     3. Dyslipidemia  Today, she is agreeable to start on rosuvastatin in the evenings. We discussed her elevated lipid levels from November. Labs have been ordered for the next follow up visit to get a new baseline.  - rosuvastatin (CRESTOR) 5 MG Tab; Take 1 Tab by mouth every evening.  Dispense: 90 Tab; Refill: 4  - TSH; Future  - Comp Metabolic Panel; Future  - FREE THYROXINE; Future  - Lipid Profile; Future  - CBC WITH DIFFERENTIAL; Future    4. Gastroesophageal reflux disease, esophagitis presence not specified  Known chronic history. Under good control. Continue  same regimen with dexlansoprazole 60 mg. She is followed by GI. Records from last colonoscopy requested and endoscopy procedure is referred.    5. Mild episode of recurrent major depressive disorder (HCC)  Patient has been stable with current management with sertraline. We will make no changes for now.   - sertraline (ZOLOFT) 50 MG Tab; Take 1 Tab by mouth every day.  Dispense: 90 Tab; Refill: 1    6. Osteopenia, unspecified location  Known chronic history. Under good control.     7. Vitamin D insufficiency  Advised to continue current supplementation of 5000 IU's daily. Labs have been ordered for the next follow up visit.   - VITAMIN D,25 HYDROXY; Future    8. Overactive bladder  Known chronic history. Under good control.     9. Short-term memory loss  She is well controlled on donepezil as a preventative measure. Refill provided.  - donepezil (ARICEPT) 5 MG Tab; Take 1 Tab by mouth every day.  Dispense: 90 Tab; Refill: 1     10. Vitamin B 12 deficiency  Known history. Labs have been ordered for the next follow up visit.   - VITAMIN B12; Future  - FOLATE; Future    11. Need for vaccination  Patient is due and encouraged to obtain vaccination at her pharmacy.  - Zoster Vac Recomb Adjuvanted (SHINGRIX) 50 MCG/0.5ML Recon Susp; 0.5 mL by Intramuscular route Once for 1 dose.  Dispense: 0.5 mL; Refill: 0         Gerard BAILEY (Selena), am scribing for, and in the presence of, González Olivas M.D..    Electronically signed by: Gerard Villeda (Selena), 2/24/2020    Gnozález BAILEY M.D., personally performed the services described in this documentation, as scribed by Gerard Villeda in my presence, and it is both accurate and complete.

## 2020-02-24 NOTE — LETTER
[facility]  González Olivas M.D.  [address]  [place] [address]  Fax: [phone]   Authorization for Release/Disclosure of   Protected Health Information   Name: XAVIER ESTRELLA : 1950 SSN: [id number]   Address: [address] [place] [address] [place] [address] Phone:    [phone] (home)    I authorize the entity listed below to release/disclose the PHI below to:   [facility]/González Olivas M.D. and González Olivas M.D.   Provider or Entity Name:[facility]     Address   City, [facility], [facility]   Phone:      Fax:  [phone]   Reason for request: continuity of care   Information to be released:    [XX  ] LAST COLONOSCOPY,  including any PATH REPORT and follow-up  [  ] LAST FIT/COLOGUARD RESULT [  ] LAST DEXA  [  ] LAST MAMMOGRAM  [  ] LAST PAP  [  ] LAST LABS [  ] RETINA EXAM REPORT  [  ] IMMUNIZATION RECORDS  [ XX ] Release all info      [  ] Check here and initial the line next to each item to release ALL health information INCLUDING  _____ Care and treatment for drug and / or alcohol abuse  _____ HIV testing, infection status, or AIDS  _____ Genetic Testing    DATES OF SERVICE OR TIME PERIOD TO BE DISCLOSED: _____________  I understand and acknowledge that:  * This Authorization may be revoked at any time by you in writing, except if your health information has already been used or disclosed.  * Your health information that will be used or disclosed as a result of you signing this authorization could be re-disclosed by the recipient. If this occurs, your re-disclosed health information may no longer be protected by State or Federal laws.  * You may refuse to sign this Authorization. Your refusal will not affect your ability to obtain treatment.  * This Authorization becomes effective upon signing and will  on (date) __________.      If no date is indicated, this Authorization will  one (1) year from the signature date.    Name: Xavier Estrella    Signature:   Date:          2/24/2020       PLEASE FAX REQUESTED RECORDS BACK TO: [phone]

## 2020-03-18 DIAGNOSIS — K64.9 HEMORRHOIDS, UNSPECIFIED HEMORRHOID TYPE: ICD-10-CM

## 2020-04-06 RX ORDER — BENZONATATE 100 MG/1
100 CAPSULE ORAL 3 TIMES DAILY PRN
Qty: 20 CAP | Refills: 4 | Status: SHIPPED | OUTPATIENT
Start: 2020-04-06 | End: 2020-07-16 | Stop reason: SDUPTHER

## 2020-06-03 ENCOUNTER — HOSPITAL ENCOUNTER (OUTPATIENT)
Dept: LAB | Facility: MEDICAL CENTER | Age: 70
End: 2020-06-03
Attending: INTERNAL MEDICINE
Payer: COMMERCIAL

## 2020-06-03 DIAGNOSIS — E55.9 VITAMIN D INSUFFICIENCY: ICD-10-CM

## 2020-06-03 DIAGNOSIS — E53.8 VITAMIN B 12 DEFICIENCY: ICD-10-CM

## 2020-06-03 DIAGNOSIS — E03.9 ACQUIRED HYPOTHYROIDISM: ICD-10-CM

## 2020-06-03 DIAGNOSIS — E78.5 DYSLIPIDEMIA: ICD-10-CM

## 2020-06-03 LAB
25(OH)D3 SERPL-MCNC: 59 NG/ML (ref 30–100)
ALBUMIN SERPL BCP-MCNC: 4 G/DL (ref 3.2–4.9)
ALBUMIN/GLOB SERPL: 1.5 G/DL
ALP SERPL-CCNC: 96 U/L (ref 30–99)
ALT SERPL-CCNC: 14 U/L (ref 2–50)
ANION GAP SERPL CALC-SCNC: 10 MMOL/L (ref 7–16)
AST SERPL-CCNC: 15 U/L (ref 12–45)
BASOPHILS # BLD AUTO: 0.6 % (ref 0–1.8)
BASOPHILS # BLD: 0.03 K/UL (ref 0–0.12)
BILIRUB SERPL-MCNC: 0.3 MG/DL (ref 0.1–1.5)
BUN SERPL-MCNC: 20 MG/DL (ref 8–22)
CALCIUM SERPL-MCNC: 9 MG/DL (ref 8.5–10.5)
CHLORIDE SERPL-SCNC: 103 MMOL/L (ref 96–112)
CHOLEST SERPL-MCNC: 218 MG/DL (ref 100–199)
CO2 SERPL-SCNC: 23 MMOL/L (ref 20–33)
CREAT SERPL-MCNC: 0.82 MG/DL (ref 0.5–1.4)
EOSINOPHIL # BLD AUTO: 0.21 K/UL (ref 0–0.51)
EOSINOPHIL NFR BLD: 4.3 % (ref 0–6.9)
ERYTHROCYTE [DISTWIDTH] IN BLOOD BY AUTOMATED COUNT: 45.4 FL (ref 35.9–50)
FASTING STATUS PATIENT QL REPORTED: NORMAL
FOLATE SERPL-MCNC: 7.5 NG/ML
GLOBULIN SER CALC-MCNC: 2.7 G/DL (ref 1.9–3.5)
GLUCOSE SERPL-MCNC: 95 MG/DL (ref 65–99)
HCT VFR BLD AUTO: 39.4 % (ref 37–47)
HDLC SERPL-MCNC: 53 MG/DL
HGB BLD-MCNC: 12.4 G/DL (ref 12–16)
IMM GRANULOCYTES # BLD AUTO: 0.01 K/UL (ref 0–0.11)
IMM GRANULOCYTES NFR BLD AUTO: 0.2 % (ref 0–0.9)
LDLC SERPL CALC-MCNC: 146 MG/DL
LYMPHOCYTES # BLD AUTO: 1.54 K/UL (ref 1–4.8)
LYMPHOCYTES NFR BLD: 31.6 % (ref 22–41)
MCH RBC QN AUTO: 29.9 PG (ref 27–33)
MCHC RBC AUTO-ENTMCNC: 31.5 G/DL (ref 33.6–35)
MCV RBC AUTO: 94.9 FL (ref 81.4–97.8)
MONOCYTES # BLD AUTO: 0.5 K/UL (ref 0–0.85)
MONOCYTES NFR BLD AUTO: 10.3 % (ref 0–13.4)
NEUTROPHILS # BLD AUTO: 2.58 K/UL (ref 2–7.15)
NEUTROPHILS NFR BLD: 53 % (ref 44–72)
NRBC # BLD AUTO: 0 K/UL
NRBC BLD-RTO: 0 /100 WBC
PLATELET # BLD AUTO: 315 K/UL (ref 164–446)
PMV BLD AUTO: 10 FL (ref 9–12.9)
POTASSIUM SERPL-SCNC: 4.5 MMOL/L (ref 3.6–5.5)
PROT SERPL-MCNC: 6.7 G/DL (ref 6–8.2)
RBC # BLD AUTO: 4.15 M/UL (ref 4.2–5.4)
SODIUM SERPL-SCNC: 136 MMOL/L (ref 135–145)
T4 FREE SERPL-MCNC: 1.86 NG/DL (ref 0.93–1.7)
TRIGL SERPL-MCNC: 95 MG/DL (ref 0–149)
TSH SERPL DL<=0.005 MIU/L-ACNC: 0.32 UIU/ML (ref 0.38–5.33)
VIT B12 SERPL-MCNC: 1735 PG/ML (ref 211–911)
WBC # BLD AUTO: 4.9 K/UL (ref 4.8–10.8)

## 2020-06-03 PROCEDURE — 80061 LIPID PANEL: CPT

## 2020-06-03 PROCEDURE — 82607 VITAMIN B-12: CPT

## 2020-06-03 PROCEDURE — 36415 COLL VENOUS BLD VENIPUNCTURE: CPT

## 2020-06-03 PROCEDURE — 84443 ASSAY THYROID STIM HORMONE: CPT

## 2020-06-03 PROCEDURE — 82306 VITAMIN D 25 HYDROXY: CPT

## 2020-06-03 PROCEDURE — 82746 ASSAY OF FOLIC ACID SERUM: CPT

## 2020-06-03 PROCEDURE — 84439 ASSAY OF FREE THYROXINE: CPT

## 2020-06-03 PROCEDURE — 80053 COMPREHEN METABOLIC PANEL: CPT

## 2020-06-03 PROCEDURE — 85025 COMPLETE CBC W/AUTO DIFF WBC: CPT

## 2020-06-08 ENCOUNTER — TELEMEDICINE (OUTPATIENT)
Dept: MEDICAL GROUP | Age: 70
End: 2020-06-08
Payer: COMMERCIAL

## 2020-06-08 VITALS
WEIGHT: 175 LBS | TEMPERATURE: 96.7 F | BODY MASS INDEX: 27.47 KG/M2 | RESPIRATION RATE: 12 BRPM | HEIGHT: 67 IN | HEART RATE: 88 BPM

## 2020-06-08 DIAGNOSIS — F33.0 MILD EPISODE OF RECURRENT MAJOR DEPRESSIVE DISORDER (HCC): ICD-10-CM

## 2020-06-08 DIAGNOSIS — E03.9 ACQUIRED HYPOTHYROIDISM: ICD-10-CM

## 2020-06-08 DIAGNOSIS — N32.81 OVERACTIVE BLADDER: ICD-10-CM

## 2020-06-08 DIAGNOSIS — Z23 NEED FOR VACCINATION: ICD-10-CM

## 2020-06-08 DIAGNOSIS — E78.2 MIXED HYPERLIPIDEMIA: ICD-10-CM

## 2020-06-08 DIAGNOSIS — K21.9 GASTROESOPHAGEAL REFLUX DISEASE, ESOPHAGITIS PRESENCE NOT SPECIFIED: ICD-10-CM

## 2020-06-08 DIAGNOSIS — E55.9 VITAMIN D INSUFFICIENCY: ICD-10-CM

## 2020-06-08 PROCEDURE — 99214 OFFICE O/P EST MOD 30 MIN: CPT | Mod: 95,CR | Performed by: INTERNAL MEDICINE

## 2020-06-08 RX ORDER — LIOTHYRONINE SODIUM 5 UG/1
5 TABLET ORAL 2 TIMES DAILY
Qty: 180 TAB | Refills: 4 | Status: SHIPPED | OUTPATIENT
Start: 2020-06-08 | End: 2020-07-16 | Stop reason: SDUPTHER

## 2020-06-08 RX ORDER — PANTOPRAZOLE SODIUM 40 MG/1
40 TABLET, DELAYED RELEASE ORAL DAILY
Qty: 90 TAB | Refills: 4 | Status: SHIPPED | OUTPATIENT
Start: 2020-06-08 | End: 2020-06-08 | Stop reason: SDUPTHER

## 2020-06-08 RX ORDER — LEVOTHYROXINE SODIUM 0.15 MG/1
150 TABLET ORAL EVERY MORNING
Qty: 90 TAB | Refills: 3 | Status: SHIPPED | OUTPATIENT
Start: 2020-06-08 | End: 2020-07-16 | Stop reason: SDUPTHER

## 2020-06-08 RX ORDER — PANTOPRAZOLE SODIUM 40 MG/1
40 TABLET, DELAYED RELEASE ORAL DAILY
Qty: 90 TAB | Refills: 4 | Status: SHIPPED | OUTPATIENT
Start: 2020-06-08 | End: 2020-07-16 | Stop reason: SDUPTHER

## 2020-06-08 ASSESSMENT — FIBROSIS 4 INDEX: FIB4 SCORE: 0.85

## 2020-06-08 NOTE — PROGRESS NOTES
Telemedicine Visit: Established Patient     This encounter was conducted via Zoom .   Verbal consent was obtained. Patient's identity was verified.    Subjective:   CC: Medication management and lab review  Lang Estrella is a 69 y.o. female presenting for evaluation and management of:  The patient is here for followup of chronic medical problems listed below. The patient is compliant with medications and having no side effects from them. Denies chest pain, abdominal pain, dyspnea, myalgias, or cough.   Patient Active Problem List    Diagnosis Date Noted   • Chronic cough 11/19/2019   • Short-term memory loss 08/19/2019   • Overactive bladder 08/19/2019   • Vitamin D insufficiency 08/19/2019   • Osteopenia 06/26/2019   • Health care maintenance 04/12/2019   • Dyslipidemia 05/10/2018   • Personal history of breast cancer 12/23/2017   • Family history of breast cancer 12/23/2017   • Herpes zoster without complication 12/23/2017   • Mild episode of recurrent major depressive disorder (HCC) 12/22/2017   • Anxiety 12/22/2017   • GERD (gastroesophageal reflux disease) 12/22/2017   • Acquired hypothyroidism 12/22/2017     Allergies   Allergen Reactions   • Levaquin Unspecified     Cramping of legs   • Statins [Hmg-Coa-R Inhibitors]      Looks up her legs     Outpatient Medications Prior to Visit   Medication Sig Dispense Refill   • benzonatate (TESSALON) 100 MG Cap Take 1 Cap by mouth 3 times a day as needed for Cough. 20 Cap 4   • donepezil (ARICEPT) 5 MG Tab Take 1 Tab by mouth every day. 90 Tab 1   • traZODone (DESYREL) 50 MG Tab TAKE 1 TABLET BY MOUTH AT  BEDTIME AS NEEDED FOR SLEEP 90 Tab 1   • RESTASIS 0.05 % ophthalmic emulsion      • oxybutynin (DITROPAN) 5 MG Tab Take 1 Tab by mouth 3 times a day. 90 Tab 2   • Magnesium 500 MG Tab Take  by mouth.     • valacyclovir (VALTREX) 1 GM Tab Take 1,000 mg by mouth as needed (rash).     • Thiamine HCl (VITAMIN B-1 PO) Take 1 Tab by mouth every day.     • VITAMIN D,  CHOLECALCIFEROL, PO Take 5,000 Units by mouth every day.     • VITAMIN E PO Take 1 Tab by mouth every day.     • Misc Natural Products (JOINT SUPPORT PO) Take 2 Tabs by mouth 2 Times a Day.     • Calcium-Magnesium-Vitamin D (CALCIUM 500 PO) Take 1 Tab by mouth 2 Times a Day.     • hydrocortisone rectal (PROCTOZONE HC) 2.5 % Cream Insert 1 Application in rectum 2 times a day. (Patient not taking: Reported on 6/8/2020) 60 g 1   • rosuvastatin (CRESTOR) 5 MG Tab Take 1 Tab by mouth every evening. 90 Tab 4   • liothyronine (CYTOMEL) 5 MCG Tab Take 1 Tab by mouth 2 Times a Day. 180 Tab 4   • sertraline (ZOLOFT) 50 MG Tab Take 1 Tab by mouth every day. 90 Tab 1   • mupirocin (BACTROBAN) 2 % Ointment APPLY TOPICALLY TO AFFECTED AREA(S) TWICE DAILY AS NEEDED  2   • SYNTHROID 150 MCG Tab Take 1 Tab by mouth every morning. ON A EMPTY STOMACH 90 Tab 3   • ranitidine (ZANTAC) 300 MG tablet      • Dexlansoprazole 60 MG CAPSULE DELAYED RELEASE delayed-release capsule Take 60 mg by mouth every day.       No facility-administered medications prior to visit.      Hospital Outpatient Visit on 06/03/2020   Component Date Value   • Folate -Folic Acid 06/03/2020 7.5    • TSH 06/03/2020 0.323*   • Sodium 06/03/2020 136    • Potassium 06/03/2020 4.5    • Chloride 06/03/2020 103    • Co2 06/03/2020 23    • Anion Gap 06/03/2020 10.0    • Glucose 06/03/2020 95    • Bun 06/03/2020 20    • Creatinine 06/03/2020 0.82    • Calcium 06/03/2020 9.0    • AST(SGOT) 06/03/2020 15    • ALT(SGPT) 06/03/2020 14    • Alkaline Phosphatase 06/03/2020 96    • Total Bilirubin 06/03/2020 0.3    • Albumin 06/03/2020 4.0    • Total Protein 06/03/2020 6.7    • Globulin 06/03/2020 2.7    • A-G Ratio 06/03/2020 1.5    • Free T-4 06/03/2020 1.86*   • Cholesterol,Tot 06/03/2020 218*   • Triglycerides 06/03/2020 95    • HDL 06/03/2020 53    • LDL 06/03/2020 146*   • WBC 06/03/2020 4.9    • RBC 06/03/2020 4.15*   • Hemoglobin 06/03/2020 12.4    • Hematocrit 06/03/2020  39.4    • MCV 06/03/2020 94.9    • MCH 06/03/2020 29.9    • MCHC 06/03/2020 31.5*   • RDW 06/03/2020 45.4    • Platelet Count 06/03/2020 315    • MPV 06/03/2020 10.0    • Neutrophils-Polys 06/03/2020 53.00    • Lymphocytes 06/03/2020 31.60    • Monocytes 06/03/2020 10.30    • Eosinophils 06/03/2020 4.30    • Basophils 06/03/2020 0.60    • Immature Granulocytes 06/03/2020 0.20    • Nucleated RBC 06/03/2020 0.00    • Neutrophils (Absolute) 06/03/2020 2.58    • Lymphs (Absolute) 06/03/2020 1.54    • Monos (Absolute) 06/03/2020 0.50    • Eos (Absolute) 06/03/2020 0.21    • Baso (Absolute) 06/03/2020 0.03    • Immature Granulocytes (a* 06/03/2020 0.01    • NRBC (Absolute) 06/03/2020 0.00    • 25-Hydroxy   Vitamin D 25 06/03/2020 59    • Vitamin B12 -True Cobala* 06/03/2020 1735*   • Fasting Status 06/03/2020 Fasting    • GFR If  06/03/2020 >60    • GFR If Non  Ameri* 06/03/2020 >60       No results found for: HBA1C  Lab Results   Component Value Date/Time    SODIUM 136 06/03/2020 12:03 PM    POTASSIUM 4.5 06/03/2020 12:03 PM    CHLORIDE 103 06/03/2020 12:03 PM    CO2 23 06/03/2020 12:03 PM    GLUCOSE 95 06/03/2020 12:03 PM    BUN 20 06/03/2020 12:03 PM    CREATININE 0.82 06/03/2020 12:03 PM    BUNCREATRAT 23 04/09/2019 08:02 AM    ALKPHOSPHAT 96 06/03/2020 12:03 PM    ASTSGOT 15 06/03/2020 12:03 PM    ALTSGPT 14 06/03/2020 12:03 PM    TBILIRUBIN 0.3 06/03/2020 12:03 PM     No results found for: INR  Lab Results   Component Value Date/Time    CHOLSTRLTOT 218 (H) 06/03/2020 12:03 PM     (H) 06/03/2020 12:03 PM    HDL 53 06/03/2020 12:03 PM    TRIGLYCERIDE 95 06/03/2020 12:03 PM       No results found for: TESTOSTERONE  Lab Results   Component Value Date/Time    TSH 1.580 06/21/2019 10:20 AM    TSH 11.950 (H) 04/09/2019 08:02 AM     Lab Results   Component Value Date/Time    FREET4 1.86 (H) 06/03/2020 12:03 PM    FREET4 1.39 11/18/2019 12:52 PM     Lab Results   Component Value Date/Time     URICACID 4.4 02/09/2017 07:47 AM     No components found for: VITB12  Lab Results   Component Value Date/Time    25HYDROXY 59 06/03/2020 12:03 PM    25HYDROXY 50 11/18/2019 12:52 PM            JESSICA    Denies any recent fevers or chills. No nausea or vomiting. No chest pains or shortness of breath.     Allergies   Allergen Reactions   • Levaquin Unspecified     Cramping of legs   • Statins [Hmg-Coa-R Inhibitors]      Looks up her legs       Current medicines (including changes today)  Current Outpatient Medications   Medication Sig Dispense Refill   • Zoster Vac Recomb Adjuvanted (SHINGRIX) 50 MCG/0.5ML Recon Susp 0.5 mL by Intramuscular route Once for 1 dose. 0.5 mL 0   • levothyroxine (SYNTHROID) 150 MCG Tab Take 1 Tab by mouth every morning. ON A EMPTY STOMACH 90 Tab 3   • liothyronine (CYTOMEL) 5 MCG Tab Take 1 Tab by mouth 2 Times a Day. 180 Tab 4   • pantoprazole (PROTONIX) 40 MG Tablet Delayed Response Take 1 Tab by mouth every day. 90 Tab 4   • sertraline (ZOLOFT) 50 MG Tab Take 1 Tab by mouth every day. 90 Tab 4   • benzonatate (TESSALON) 100 MG Cap Take 1 Cap by mouth 3 times a day as needed for Cough. 20 Cap 4   • donepezil (ARICEPT) 5 MG Tab Take 1 Tab by mouth every day. 90 Tab 1   • traZODone (DESYREL) 50 MG Tab TAKE 1 TABLET BY MOUTH AT  BEDTIME AS NEEDED FOR SLEEP 90 Tab 1   • RESTASIS 0.05 % ophthalmic emulsion      • oxybutynin (DITROPAN) 5 MG Tab Take 1 Tab by mouth 3 times a day. 90 Tab 2   • Magnesium 500 MG Tab Take  by mouth.     • valacyclovir (VALTREX) 1 GM Tab Take 1,000 mg by mouth as needed (rash).     • Thiamine HCl (VITAMIN B-1 PO) Take 1 Tab by mouth every day.     • VITAMIN D, CHOLECALCIFEROL, PO Take 5,000 Units by mouth every day.     • VITAMIN E PO Take 1 Tab by mouth every day.     • Misc Natural Products (JOINT SUPPORT PO) Take 2 Tabs by mouth 2 Times a Day.     • Calcium-Magnesium-Vitamin D (CALCIUM 500 PO) Take 1 Tab by mouth 2 Times a Day.     • rosuvastatin (CRESTOR) 5 MG Tab  "Take 1 Tab by mouth every evening. 90 Tab 4     No current facility-administered medications for this visit.        Patient Active Problem List    Diagnosis Date Noted   • Chronic cough 11/19/2019   • Short-term memory loss 08/19/2019   • Overactive bladder 08/19/2019   • Vitamin D insufficiency 08/19/2019   • Osteopenia 06/26/2019   • Health care maintenance 04/12/2019   • Dyslipidemia 05/10/2018   • Personal history of breast cancer 12/23/2017   • Family history of breast cancer 12/23/2017   • Herpes zoster without complication 12/23/2017   • Mild episode of recurrent major depressive disorder (HCC) 12/22/2017   • Anxiety 12/22/2017   • GERD (gastroesophageal reflux disease) 12/22/2017   • Acquired hypothyroidism 12/22/2017       Family History   Problem Relation Age of Onset   • Breast Cancer Mother    • Stroke Mother    • Cancer Father         brain- most likely mets   • Heart Disease Father    • Cancer Sister 55        ovarian cancer - BRCA checked for her and was negative   • Diabetes Brother    • Cancer Brother    • Diabetes Maternal Grandfather    • Cancer Sister 59        brain and breast cancer   • Cancer Brother         prostate cancer       She  has a past medical history of Arthritis, Breast cancer (HCC), Bronchitis, Cancer (HCC) (2000), Cold (11/2017), Disorder of thyroid, Heart burn, Hiatus hernia syndrome, Indigestion, Pneumonia, Psychiatric problem, Snoring, and Urinary incontinence.  She  has a past surgical history that includes septoplasty (N/A, 1/15/2016); turbinoplasty (Bilateral, 1/15/2016); lumpectomy; mastectomy (2001); pr radiation therapy plan simple; other orthopedic surgery; other; other; pelviscopy (12/29/2017); and laparoscopic bilateral salpingo oopherectomy (Bilateral, 12/29/2017).       Objective:   Pulse 88   Temp 35.9 °C (96.7 °F) (Oral)   Resp 12   Ht 1.702 m (5' 7\")   Wt 79.4 kg (175 lb)   BMI 27.41 kg/m²     Physical Exam: Pulse 88   Temp 35.9 °C (96.7 °F) (Oral)   Resp " "12   Ht 1.702 m (5' 7\")   Wt 79.4 kg (175 lb)   BMI 27.41 kg/m²   \  Constitutional: Alert, no distress, well-groomed.  Skin: No rashes in visible areas.  Eye: Round. Conjunctiva clear, lids normal. No icterus.   ENMT: Lips pink without lesions, good dentition, moist mucous membranes. Phonation normal.  Neck: No masses, no thyromegaly. Moves freely without pain.  CV: Pulse as reported by patient  Respiratory: Unlabored respiratory effort, no cough or audible wheeze  Psych: Alert and oriented x3, normal affect and mood.       Assessment and Plan:   The following treatment plan was discussed:     1. Gastroesophageal reflux disease, esophagitis presence not specified-under good control.  Continue PPI therapy but switch to cheaper alternative of Protonix or Dexilant.  Prior upper endoscopy showed no evidence of reflux esophagitis or adverse effects from reflux.  - pantoprazole (PROTONIX) 40 MG Tablet Delayed Response; Take 1 Tab by mouth every day.  Dispense: 90 Tab; Refill: 4    2. Acquired hypothyroidism-under good control.  Continue same regimen but switch to generic due to cost constraints.  - TSH; Future  - levothyroxine (SYNTHROID) 150 MCG Tab; Take 1 Tab by mouth every morning. ON A EMPTY STOMACH  Dispense: 90 Tab; Refill: 3  - liothyronine (CYTOMEL) 5 MCG Tab; Take 1 Tab by mouth 2 Times a Day.  Dispense: 180 Tab; Refill: 4    3. Overactive bladder-under good control.  Continue same regimen.    4. Vitamin D insufficiencyUnder good control.  Continue vitamin D replacement.    5. Mixed hyperlipidemia-borderline mild on no medications.  Intolerant of statins due to myalgias.  Not severe enough to warrant medication at this time with good cholesterol HDL ratio of 4.0  - TSH; Future  - Comp Metabolic Panel; Future  - Lipid Profile; Future  - CBC WITH DIFFERENTIAL; Future  - VITAMIN D,25 HYDROXY; Future    6. Need for vaccination  - Zoster Vac Recomb Adjuvanted (SHINGRIX) 50 MCG/0.5ML Recon Susp; 0.5 mL by " Intramuscular route Once for 1 dose.  Dispense: 0.5 mL; Refill: 0    7. Mild episode of recurrent major depressive disorder (HCC)  - sertraline (ZOLOFT) 50 MG Tab; Take 1 Tab by mouth every day.  Dispense: 90 Tab; Refill: 4    Good control.  Continue same regimen    Follow-up: No follow-ups on file.

## 2020-07-13 ENCOUNTER — TELEPHONE (OUTPATIENT)
Dept: MEDICAL GROUP | Age: 70
End: 2020-07-13

## 2020-07-13 DIAGNOSIS — R41.3 SHORT-TERM MEMORY LOSS: ICD-10-CM

## 2020-07-14 RX ORDER — DONEPEZIL HYDROCHLORIDE 5 MG/1
TABLET, FILM COATED ORAL
Qty: 90 TAB | Refills: 1 | Status: SHIPPED | OUTPATIENT
Start: 2020-07-14 | End: 2020-07-16 | Stop reason: SDUPTHER

## 2020-07-14 NOTE — TELEPHONE ENCOUNTER
VOICEMAIL  1. Caller Name: Lang Estrella                        Call Back Number: [phone] (home)       2. Message: Pt stated that she would like her us to call her prescription over to [facility] because that is her new preferred pharmacy. Their phone number is [phone] their fax number is [phone].    3. Patient approves office to leave a detailed voicemail/MyChart message: N\A    Phone Number Called: [phone]       Call outcome: Did not have a voicemail set up     Message: 1st attempt   Which medication or does she want all of them sent to that pharmacy?

## 2020-07-15 NOTE — TELEPHONE ENCOUNTER
Phone Number Called: [phone]     Call outcome: Did not leave a detailed message. Requested patient to call back.    Message: 2nd attempt

## 2020-07-16 DIAGNOSIS — R05.9 COUGH: ICD-10-CM

## 2020-07-16 DIAGNOSIS — E78.5 DYSLIPIDEMIA: ICD-10-CM

## 2020-07-16 DIAGNOSIS — K21.9 GASTROESOPHAGEAL REFLUX DISEASE, ESOPHAGITIS PRESENCE NOT SPECIFIED: ICD-10-CM

## 2020-07-16 DIAGNOSIS — R41.3 SHORT-TERM MEMORY LOSS: ICD-10-CM

## 2020-07-16 DIAGNOSIS — E03.9 ACQUIRED HYPOTHYROIDISM: ICD-10-CM

## 2020-07-16 DIAGNOSIS — F33.0 MILD EPISODE OF RECURRENT MAJOR DEPRESSIVE DISORDER (HCC): ICD-10-CM

## 2020-07-16 RX ORDER — TRAZODONE HYDROCHLORIDE 50 MG/1
TABLET ORAL
Qty: 90 TAB | Refills: 4 | Status: SHIPPED | OUTPATIENT
Start: 2020-07-16

## 2020-07-16 RX ORDER — ROSUVASTATIN CALCIUM 5 MG/1
5 TABLET, COATED ORAL EVERY EVENING
Qty: 90 TAB | Refills: 4 | Status: SHIPPED | OUTPATIENT
Start: 2020-07-16 | End: 2021-08-26 | Stop reason: SINTOL

## 2020-07-16 RX ORDER — DONEPEZIL HYDROCHLORIDE 5 MG/1
5 TABLET, FILM COATED ORAL
Qty: 90 TAB | Refills: 4 | Status: SHIPPED | OUTPATIENT
Start: 2020-07-16 | End: 2021-07-13

## 2020-07-16 RX ORDER — BENZONATATE 100 MG/1
100 CAPSULE ORAL 3 TIMES DAILY PRN
Qty: 90 CAP | Refills: 4 | Status: SHIPPED | OUTPATIENT
Start: 2020-07-16

## 2020-07-16 RX ORDER — PANTOPRAZOLE SODIUM 40 MG/1
40 TABLET, DELAYED RELEASE ORAL DAILY
Qty: 90 TAB | Refills: 4 | Status: SHIPPED | OUTPATIENT
Start: 2020-07-16 | End: 2020-07-16 | Stop reason: SDUPTHER

## 2020-07-16 RX ORDER — BENZONATATE 100 MG/1
100 CAPSULE ORAL 3 TIMES DAILY PRN
Qty: 20 CAP | Refills: 4 | Status: SHIPPED | OUTPATIENT
Start: 2020-07-16 | End: 2020-07-16 | Stop reason: SDUPTHER

## 2020-07-16 RX ORDER — LIOTHYRONINE SODIUM 5 UG/1
5 TABLET ORAL 2 TIMES DAILY
Qty: 180 TAB | Refills: 4 | Status: SHIPPED | OUTPATIENT
Start: 2020-07-16 | End: 2020-07-16 | Stop reason: SDUPTHER

## 2020-07-16 RX ORDER — LEVOTHYROXINE SODIUM 0.15 MG/1
150 TABLET ORAL EVERY MORNING
Qty: 90 TAB | Refills: 3 | Status: SHIPPED | OUTPATIENT
Start: 2020-07-16 | End: 2020-07-16 | Stop reason: SDUPTHER

## 2020-07-16 RX ORDER — LEVOTHYROXINE SODIUM 0.15 MG/1
150 TABLET ORAL EVERY MORNING
Qty: 90 TAB | Refills: 3 | Status: SHIPPED | OUTPATIENT
Start: 2020-07-16 | End: 2021-08-26

## 2020-07-16 RX ORDER — PANTOPRAZOLE SODIUM 40 MG/1
40 TABLET, DELAYED RELEASE ORAL DAILY
Qty: 90 TAB | Refills: 4 | Status: SHIPPED | OUTPATIENT
Start: 2020-07-16

## 2020-07-16 RX ORDER — DONEPEZIL HYDROCHLORIDE 5 MG/1
5 TABLET, FILM COATED ORAL
Qty: 90 TAB | Refills: 1 | Status: SHIPPED | OUTPATIENT
Start: 2020-07-16 | End: 2020-07-16 | Stop reason: SDUPTHER

## 2020-07-16 RX ORDER — LIOTHYRONINE SODIUM 5 UG/1
5 TABLET ORAL 2 TIMES DAILY
Qty: 180 TAB | Refills: 4 | Status: SHIPPED | OUTPATIENT
Start: 2020-07-16 | End: 2021-07-20

## 2020-07-16 NOTE — TELEPHONE ENCOUNTER
Medication have been sent to new pharmacy [facility] and pharmacy changed in Lang Estrella's chart. Thank you!

## 2020-07-28 ENCOUNTER — HOSPITAL ENCOUNTER (OUTPATIENT)
Dept: RADIOLOGY | Facility: MEDICAL CENTER | Age: 70
End: 2020-07-28
Attending: NURSE PRACTITIONER
Payer: MEDICARE

## 2020-07-28 ENCOUNTER — APPOINTMENT (RX ONLY)
Dept: URBAN - METROPOLITAN AREA CLINIC 22 | Facility: CLINIC | Age: 70
Setting detail: DERMATOLOGY
End: 2020-07-28

## 2020-07-28 DIAGNOSIS — D22 MELANOCYTIC NEVI: ICD-10-CM

## 2020-07-28 DIAGNOSIS — L24.9 IRRITANT CONTACT DERMATITIS, UNSPECIFIED CAUSE: ICD-10-CM

## 2020-07-28 DIAGNOSIS — N64.4 PAINFUL BREASTS: ICD-10-CM

## 2020-07-28 DIAGNOSIS — N63.22 LUMP IN UPPER INNER QUADRANT OF LEFT BREAST: ICD-10-CM

## 2020-07-28 DIAGNOSIS — Z71.89 OTHER SPECIFIED COUNSELING: ICD-10-CM

## 2020-07-28 DIAGNOSIS — L81.4 OTHER MELANIN HYPERPIGMENTATION: ICD-10-CM

## 2020-07-28 DIAGNOSIS — L82.1 OTHER SEBORRHEIC KERATOSIS: ICD-10-CM

## 2020-07-28 DIAGNOSIS — D18.0 HEMANGIOMA: ICD-10-CM

## 2020-07-28 PROBLEM — D18.01 HEMANGIOMA OF SKIN AND SUBCUTANEOUS TISSUE: Status: ACTIVE | Noted: 2020-07-28

## 2020-07-28 PROBLEM — D22.5 MELANOCYTIC NEVI OF TRUNK: Status: ACTIVE | Noted: 2020-07-28

## 2020-07-28 PROCEDURE — 76642 ULTRASOUND BREAST LIMITED: CPT | Mod: RT

## 2020-07-28 PROCEDURE — ? COUNSELING

## 2020-07-28 PROCEDURE — ? PRESCRIPTION

## 2020-07-28 PROCEDURE — 99214 OFFICE O/P EST MOD 30 MIN: CPT

## 2020-07-28 PROCEDURE — G0279 TOMOSYNTHESIS, MAMMO: HCPCS

## 2020-07-28 RX ORDER — HYDROCORTISONE 25 MG/G
CREAM TOPICAL
Qty: 1 | Refills: 1 | Status: ERX | COMMUNITY
Start: 2020-07-28

## 2020-07-28 RX ADMIN — HYDROCORTISONE: 25 CREAM TOPICAL at 00:00

## 2020-07-28 ASSESSMENT — LOCATION SIMPLE DESCRIPTION DERM
LOCATION SIMPLE: UPPER BACK
LOCATION SIMPLE: RIGHT UPPER BACK
LOCATION SIMPLE: LEFT FOREARM
LOCATION SIMPLE: CHEST
LOCATION SIMPLE: RIGHT FOREARM
LOCATION SIMPLE: INFERIOR FOREHEAD

## 2020-07-28 ASSESSMENT — LOCATION DETAILED DESCRIPTION DERM
LOCATION DETAILED: RIGHT VENTRAL PROXIMAL FOREARM
LOCATION DETAILED: INFERIOR THORACIC SPINE
LOCATION DETAILED: LEFT VENTRAL PROXIMAL FOREARM
LOCATION DETAILED: SUPERIOR THORACIC SPINE
LOCATION DETAILED: RIGHT MID-UPPER BACK
LOCATION DETAILED: INFERIOR MID FOREHEAD
LOCATION DETAILED: LOWER STERNUM
LOCATION DETAILED: UPPER STERNUM

## 2020-07-28 ASSESSMENT — LOCATION ZONE DERM
LOCATION ZONE: TRUNK
LOCATION ZONE: FACE
LOCATION ZONE: ARM

## 2020-07-28 ASSESSMENT — SEVERITY ASSESSMENT 2020: SEVERITY 2020: CLEAR

## 2020-07-29 ENCOUNTER — PATIENT MESSAGE (OUTPATIENT)
Dept: MEDICAL GROUP | Age: 70
End: 2020-07-29

## 2020-07-29 DIAGNOSIS — H25.013 CORTICAL AGE-RELATED CATARACT OF BOTH EYES: ICD-10-CM

## 2021-03-03 DIAGNOSIS — Z23 NEED FOR VACCINATION: ICD-10-CM

## 2021-06-19 ENCOUNTER — HOSPITAL ENCOUNTER (OUTPATIENT)
Dept: LAB | Facility: MEDICAL CENTER | Age: 71
End: 2021-06-19
Attending: PHYSICIAN ASSISTANT
Payer: MEDICARE

## 2021-06-19 LAB
25(OH)D3 SERPL-MCNC: 69 NG/ML (ref 30–100)
ALBUMIN SERPL BCP-MCNC: 4 G/DL (ref 3.2–4.9)
ALBUMIN/GLOB SERPL: 1.3 G/DL
ALP SERPL-CCNC: 100 U/L (ref 30–99)
ALT SERPL-CCNC: 16 U/L (ref 2–50)
ANION GAP SERPL CALC-SCNC: 11 MMOL/L (ref 7–16)
AST SERPL-CCNC: 23 U/L (ref 12–45)
BILIRUB SERPL-MCNC: 0.5 MG/DL (ref 0.1–1.5)
BUN SERPL-MCNC: 28 MG/DL (ref 8–22)
CALCIUM SERPL-MCNC: 9.4 MG/DL (ref 8.5–10.5)
CHLORIDE SERPL-SCNC: 105 MMOL/L (ref 96–112)
CHOLEST SERPL-MCNC: 254 MG/DL (ref 100–199)
CO2 SERPL-SCNC: 23 MMOL/L (ref 20–33)
CREAT SERPL-MCNC: 1.14 MG/DL (ref 0.5–1.4)
CRP SERPL HS-MCNC: 17.5 MG/L (ref 0–7.5)
GLOBULIN SER CALC-MCNC: 3.2 G/DL (ref 1.9–3.5)
GLUCOSE SERPL-MCNC: 90 MG/DL (ref 65–99)
HDLC SERPL-MCNC: 66 MG/DL
LDLC SERPL CALC-MCNC: 178 MG/DL
POTASSIUM SERPL-SCNC: 4.5 MMOL/L (ref 3.6–5.5)
PROT SERPL-MCNC: 7.2 G/DL (ref 6–8.2)
SODIUM SERPL-SCNC: 139 MMOL/L (ref 135–145)
T3FREE SERPL-MCNC: 3.71 PG/ML (ref 2–4.4)
T4 FREE SERPL-MCNC: 2.31 NG/DL (ref 0.93–1.7)
TRIGL SERPL-MCNC: 50 MG/DL (ref 0–149)
TSH SERPL DL<=0.005 MIU/L-ACNC: 0.28 UIU/ML (ref 0.38–5.33)

## 2021-06-19 PROCEDURE — 84481 FREE ASSAY (FT-3): CPT

## 2021-06-19 PROCEDURE — 80053 COMPREHEN METABOLIC PANEL: CPT

## 2021-06-19 PROCEDURE — 84439 ASSAY OF FREE THYROXINE: CPT

## 2021-06-19 PROCEDURE — 82306 VITAMIN D 25 HYDROXY: CPT

## 2021-06-19 PROCEDURE — 84443 ASSAY THYROID STIM HORMONE: CPT

## 2021-06-19 PROCEDURE — 86141 C-REACTIVE PROTEIN HS: CPT

## 2021-06-19 PROCEDURE — 80061 LIPID PANEL: CPT

## 2021-06-19 PROCEDURE — 86800 THYROGLOBULIN ANTIBODY: CPT

## 2021-06-19 PROCEDURE — 36415 COLL VENOUS BLD VENIPUNCTURE: CPT

## 2021-06-22 LAB — THYROGLOB AB SERPL-ACNC: 16.6 IU/ML (ref 0–4)

## 2021-07-12 DIAGNOSIS — R41.3 SHORT-TERM MEMORY LOSS: ICD-10-CM

## 2021-07-13 RX ORDER — DONEPEZIL HYDROCHLORIDE 5 MG/1
TABLET, FILM COATED ORAL
Qty: 90 TABLET | Refills: 3 | Status: SHIPPED | OUTPATIENT
Start: 2021-07-13

## 2021-07-13 NOTE — TELEPHONE ENCOUNTER








[facility]


negative.


Zi Melgoza MD Feb 6, 2018 12:14 Received request via: Pharmacy    Was the patient seen in the last year in this department? Yes    Does the patient have an active prescription (recently filled or refills available) for medication(s) requested? No

## 2021-07-19 DIAGNOSIS — E03.9 ACQUIRED HYPOTHYROIDISM: ICD-10-CM

## 2021-07-20 RX ORDER — LIOTHYRONINE SODIUM 5 UG/1
TABLET ORAL
Qty: 180 TABLET | Refills: 3 | Status: SHIPPED | OUTPATIENT
Start: 2021-07-20

## 2021-07-22 ENCOUNTER — HOSPITAL ENCOUNTER (OUTPATIENT)
Dept: LAB | Facility: MEDICAL CENTER | Age: 71
End: 2021-07-22
Attending: PHYSICIAN ASSISTANT
Payer: MEDICARE

## 2021-07-22 LAB
ALBUMIN SERPL BCP-MCNC: 4 G/DL (ref 3.2–4.9)
BUN SERPL-MCNC: 16 MG/DL (ref 8–22)
CALCIUM SERPL-MCNC: 9.2 MG/DL (ref 8.5–10.5)
CHLORIDE SERPL-SCNC: 98 MMOL/L (ref 96–112)
CO2 SERPL-SCNC: 20 MMOL/L (ref 20–33)
CREAT SERPL-MCNC: 0.9 MG/DL (ref 0.5–1.4)
GLUCOSE SERPL-MCNC: 94 MG/DL (ref 65–99)
PHOSPHATE SERPL-MCNC: 3.4 MG/DL (ref 2.5–4.5)
POTASSIUM SERPL-SCNC: 4.6 MMOL/L (ref 3.6–5.5)
SODIUM SERPL-SCNC: 130 MMOL/L (ref 135–145)
TSH SERPL DL<=0.005 MIU/L-ACNC: 0.59 UIU/ML (ref 0.38–5.33)

## 2021-07-22 PROCEDURE — 36415 COLL VENOUS BLD VENIPUNCTURE: CPT

## 2021-07-22 PROCEDURE — 80069 RENAL FUNCTION PANEL: CPT

## 2021-07-22 PROCEDURE — 84443 ASSAY THYROID STIM HORMONE: CPT | Mod: GA

## 2021-07-22 PROCEDURE — 86800 THYROGLOBULIN ANTIBODY: CPT

## 2021-07-24 LAB — THYROGLOB AB SERPL-ACNC: 15.5 IU/ML (ref 0–4)

## 2021-08-10 ENCOUNTER — APPOINTMENT (RX ONLY)
Dept: URBAN - METROPOLITAN AREA CLINIC 22 | Facility: CLINIC | Age: 71
Setting detail: DERMATOLOGY
End: 2021-08-10

## 2021-08-10 DIAGNOSIS — L82.1 OTHER SEBORRHEIC KERATOSIS: ICD-10-CM

## 2021-08-10 DIAGNOSIS — L73.8 OTHER SPECIFIED FOLLICULAR DISORDERS: ICD-10-CM

## 2021-08-10 DIAGNOSIS — L81.4 OTHER MELANIN HYPERPIGMENTATION: ICD-10-CM

## 2021-08-10 DIAGNOSIS — D22 MELANOCYTIC NEVI: ICD-10-CM

## 2021-08-10 DIAGNOSIS — L72.0 EPIDERMAL CYST: ICD-10-CM

## 2021-08-10 DIAGNOSIS — Z71.89 OTHER SPECIFIED COUNSELING: ICD-10-CM

## 2021-08-10 PROBLEM — D48.5 NEOPLASM OF UNCERTAIN BEHAVIOR OF SKIN: Status: ACTIVE | Noted: 2021-08-10

## 2021-08-10 PROBLEM — D22.5 MELANOCYTIC NEVI OF TRUNK: Status: ACTIVE | Noted: 2021-08-10

## 2021-08-10 PROCEDURE — 99213 OFFICE O/P EST LOW 20 MIN: CPT | Mod: 25

## 2021-08-10 PROCEDURE — 11102 TANGNTL BX SKIN SINGLE LES: CPT

## 2021-08-10 PROCEDURE — ? EXTRACTIONS

## 2021-08-10 PROCEDURE — ? BIOPSY BY SHAVE METHOD

## 2021-08-10 PROCEDURE — ? COUNSELING

## 2021-08-10 PROCEDURE — ? SUNSCREEN TREATMENT REGIMEN

## 2021-08-10 ASSESSMENT — LOCATION SIMPLE DESCRIPTION DERM
LOCATION SIMPLE: RIGHT CHEEK
LOCATION SIMPLE: RIGHT FOREARM
LOCATION SIMPLE: CHEST
LOCATION SIMPLE: LEFT FOREARM
LOCATION SIMPLE: UPPER BACK
LOCATION SIMPLE: INFERIOR FOREHEAD

## 2021-08-10 ASSESSMENT — LOCATION DETAILED DESCRIPTION DERM
LOCATION DETAILED: INFERIOR MID FOREHEAD
LOCATION DETAILED: RIGHT VENTRAL PROXIMAL FOREARM
LOCATION DETAILED: RIGHT SUPERIOR MEDIAL MALAR CHEEK
LOCATION DETAILED: LEFT VENTRAL PROXIMAL FOREARM
LOCATION DETAILED: LEFT LATERAL SUPERIOR CHEST
LOCATION DETAILED: SUPERIOR THORACIC SPINE
LOCATION DETAILED: LOWER STERNUM
LOCATION DETAILED: INFERIOR THORACIC SPINE

## 2021-08-10 ASSESSMENT — LOCATION ZONE DERM
LOCATION ZONE: FACE
LOCATION ZONE: TRUNK
LOCATION ZONE: ARM

## 2021-08-10 NOTE — PROCEDURE: EXTRACTIONS
Render Number Of Lesions Treated: yes
Extraction Method: 11 blade and q-tip
Render Post-Care Instructions In Note?: no
Post-Care Instructions: I reviewed with the patient in detail post-care instructions. Patient is to keep the treatment areas dry overnight, and then apply bacitracin twice daily until healed. Patient may apply hydrogen peroxide soaks to remove any crusting.
Hemostasis: Pressure
Consent was obtained and risks were reviewed including but not limited to scarring, infection, bleeding, scabbing, incomplete removal, and allergy to anesthesia.
Acne Type: A cyst
Prep Text (Optional): Prior to removal the treatment areas were prepped in the usual fashion with alcohol cleanser
Detail Level: Detailed

## 2021-08-17 ENCOUNTER — TELEPHONE (OUTPATIENT)
Dept: CARDIOLOGY | Facility: MEDICAL CENTER | Age: 71
End: 2021-08-17

## 2021-08-17 NOTE — TELEPHONE ENCOUNTER
Chart Prep  S/W Pt in regards to NP appt. Pt has not seen a previous cardiologist but has had an EKG done with Dr. Dalton Gracia's office. I have requested the EKG and any other cardiac testing that may have been done. Pt's labs are most recent in Epic. Pt verbally understood time, date and location of appt.

## 2021-08-20 NOTE — PROGRESS NOTES
Subjective:   Chief Complaint: No chief complaint on file.      Lang Estrella is a 71 y.o. 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    DATA REVIEWED by me:  ECG (my personal interpretation)    Echo    Most recent labs:       Lab Results   Component Value Date/Time    WBC 4.9 06/03/2020 12:03 PM    HEMOGLOBIN 12.4 06/03/2020 12:03 PM    HEMATOCRIT 39.4 06/03/2020 12:03 PM    MCV 94.9 06/03/2020 12:03 PM    TSH 1.580 06/21/2019 10:20 AM      Lab Results   Component Value Date/Time    SODIUM 130 (L) 07/22/2021 01:56 PM    POTASSIUM 4.6 07/22/2021 01:56 PM    CHLORIDE 98 07/22/2021 01:56 PM    CO2 20 07/22/2021 01:56 PM    GLUCOSE 94 07/22/2021 01:56 PM    BUN 16 07/22/2021 01:56 PM    CREATININE 0.90 07/22/2021 01:56 PM      Lab Results   Component Value Date/Time    ASTSGOT 23 06/19/2021 11:55 AM    ALTSGPT 16 06/19/2021 11:55 AM    ALBUMIN 4.0 07/22/2021 01:56 PM      Lab Results   Component Value Date/Time    CHOLSTRLTOT 254 (H) 06/19/2021 11:55 AM     (H) 06/19/2021 11:55 AM    HDL 66 06/19/2021 11:55 AM    TRIGLYCERIDE 50 06/19/2021 11:55 AM     No results for input(s): NTPROBNP, TROPONINT in the last 72 hours.      Past Medical History:   Diagnosis Date   • Arthritis     haNDS   • Breast cancer (HCC)    • Bronchitis    • Cancer (HCC) 2000    breast   • Cold 11/2017    sinus infection- on augmentin   • Disorder of thyroid    • Heart burn    • Hiatus hernia syndrome    • Indigestion    • Pneumonia    • Psychiatric problem    • Snoring    • Urinary incontinence      Past Surgical History:   Procedure Laterality Date   • PELVISCOPY  12/29/2017    Procedure: PELVISCOPY;  Surgeon: Haydee River M.D.;  Location: SURGERY SAME DAY [facility];  Service: Gynecology   • LAPAROSCOPIC BILATERAL SALPINGO OOPHERECTOMY Bilateral 12/29/2017    Procedure: LAPAROSCOPIC BILATERAL SALPINGO OOPHERECTOMY;  Surgeon: Haydee F Wellhoner, M.D.;  Location: SURGERY SAME DAY [facility];  Service: Gynecology    • SEPTOPLASTY N/A 1/15/2016    Procedure: SEPTOPLASTY;  Surgeon: Randolph Kinney M.D.;  Location: SURGERY SAME DAY [facility];  Service:    • TURBINOPLASTY Bilateral 1/15/2016    Procedure: TURBINOPLASTY;  Surgeon: Randolph Kinney M.D.;  Location: SURGERY SAME DAY [facility];  Service:    • MASTECTOMY  2001   • LUMPECTOMY     • OTHER      Breast cancer and breast implants   • OTHER      sinus surgery   • OTHER ORTHOPEDIC SURGERY      bunions alethea   • PB RADIATION THERAPY PLAN SIMPLE       Family History   Problem Relation Age of Onset   • Breast Cancer Mother    • Stroke Mother    • Cancer Father         brain- most likely mets   • Heart Disease Father    • Cancer Sister 55        ovarian cancer - BRCA checked for her and was negative   • Diabetes Brother    • Cancer Brother    • Diabetes Maternal Grandfather    • Cancer Sister 59        brain and breast cancer   • Cancer Brother         prostate cancer     Social History     Socioeconomic History   • Marital status:      Spouse name: Not on file   • Number of children: Not on file   • Years of education: Not on file   • Highest education level: Not on file   Occupational History   • Not on file   Tobacco Use   • Smoking status: Never Smoker   • Smokeless tobacco: Never Used   Vaping Use   • Vaping Use: Never used   Substance and Sexual Activity   • Alcohol use: Not Currently     Alcohol/week: 0.6 oz     Types: 1 Glasses of wine per week     Comment: 3/month 6/26/19   • Drug use: No   • Sexual activity: Not Currently     Partners: Male   Other Topics Concern   • Not on file   Social History Narrative   • Not on file     Social Determinants of Health     Financial Resource Strain:    • Difficulty of Paying Living Expenses:    Food Insecurity:    • Worried About Running Out of Food in the Last Year:    • Ran Out of Food in the Last Year:    Transportation Needs:    • Lack of Transportation (Medical):    • Lack of Transportation (Non-Medical):     Physical Activity:    • Days of Exercise per Week:    • Minutes of Exercise per Session:    Stress:    • Feeling of Stress :    Social Connections:    • Frequency of Communication with Friends and Family:    • Frequency of Social Gatherings with Friends and Family:    • Attends [employer] Services:    • Active Member of Clubs or Organizations:    • Attends Club or Organization Meetings:    • Marital Status:    Intimate Partner Violence:    • Fear of Current or Ex-Partner:    • Emotionally Abused:    • Physically Abused:    • Sexually Abused:      Allergies   Allergen Reactions   • Levaquin Unspecified     Cramping of legs   • Statins [Hmg-Coa-R Inhibitors]      Looks up her legs       Current Outpatient Medications   Medication Sig Dispense Refill   • liothyronine (CYTOMEL) 5 MCG Tab TAKE 1 TABLET TWICE A  tablet 3   • donepezil (ARICEPT) 5 MG Tab TAKE 1 TABLET DAILY 90 tablet 3   • benzonatate (TESSALON) 100 MG Cap Take 1 Cap by mouth 3 times a day as needed for Cough. 90 Cap 4   • levothyroxine (SYNTHROID) 150 MCG Tab Take 1 Tab by mouth every morning. ON A EMPTY STOMACH 90 Tab 3   • pantoprazole (PROTONIX) 40 MG Tablet Delayed Response Take 1 Tab by mouth every day. 90 Tab 4   • sertraline (ZOLOFT) 50 MG Tab Take 1 Tab by mouth every day. 90 Tab 4   • rosuvastatin (CRESTOR) 5 MG Tab Take 1 Tab by mouth every evening. 90 Tab 4   • traZODone (DESYREL) 50 MG Tab TAKE 1 TABLET BY MOUTH AT  BEDTIME AS NEEDED FOR SLEEP 90 Tab 4   • RESTASIS 0.05 % ophthalmic emulsion      • oxybutynin (DITROPAN) 5 MG Tab Take 1 Tab by mouth 3 times a day. 90 Tab 2   • Magnesium 500 MG Tab Take  by mouth.     • valacyclovir (VALTREX) 1 GM Tab Take 1,000 mg by mouth as needed (rash).     • Thiamine HCl (VITAMIN B-1 PO) Take 1 Tab by mouth every day.     • VITAMIN D, CHOLECALCIFEROL, PO Take 5,000 Units by mouth every day.     • VITAMIN E PO Take 1 Tab by mouth every day.     • Misc Natural Products (JOINT SUPPORT PO) Take 2 Tabs  by mouth 2 Times a Day.     • Calcium-Magnesium-Vitamin D (CALCIUM 500 PO) Take 1 Tab by mouth 2 Times a Day.       No current facility-administered medications for this visit.       ROS  All others systems reviewed and negative.     Objective:     There were no vitals taken for this visit. There is no height or weight on file to calculate BMI.    General: No acute distress. Well nourished.  HEENT: EOM grossly intact, no scleral icterus, no pharyngeal erythema.   Neck:  No JVD, no bruits, trachea midline  CVS: RRR. Normal S1, S2. No M/R/G. No LE edema.  2+ radial pulses, 2+ PT pulses  Resp: CTAB. No wheezing or crackles/rhonchi. Normal respiratory effort.  Abdomen: Soft, NT, no eulogio hepatomegaly.  MSK/Ext: No clubbing or cyanosis.  Skin: Warm and dry, no rashes.  Neurological: CN III-XII grossly intact. No focal deficits.   Psych: A&O x 3, appropriate affect, good judgement        Assessment:     1. Familial hyperlipidemia, high LDL     2. Vitamin D insufficiency         Medical Decision Making:  Today's Assessment / Status / Plan:               Written instructions given today:        No follow-ups on file.    It is my pleasure to participate in the care of Ms. Estrella.  Please do not hesitate to contact me with questions or concerns.    Bere Oakley MD, [facility]  Cardiologist [facility]    Please note that this dictation was created using voice recognition software. I have made every reasonable attempt to correct obvious errors, but it is possible there are errors of grammar and possibly content that I did not discover before finalizing the note.

## 2021-08-26 ENCOUNTER — OFFICE VISIT (OUTPATIENT)
Dept: CARDIOLOGY | Facility: MEDICAL CENTER | Age: 71
End: 2021-08-26

## 2021-08-26 ENCOUNTER — HOSPITAL ENCOUNTER (OUTPATIENT)
Dept: RADIOLOGY | Facility: MEDICAL CENTER | Age: 71
End: 2021-08-26
Attending: INTERNAL MEDICINE
Payer: MEDICARE

## 2021-08-26 ENCOUNTER — APPOINTMENT (OUTPATIENT)
Dept: CARDIOLOGY | Facility: MEDICAL CENTER | Age: 71
End: 2021-08-26
Payer: MEDICARE

## 2021-08-26 VITALS
DIASTOLIC BLOOD PRESSURE: 64 MMHG | BODY MASS INDEX: 29.35 KG/M2 | OXYGEN SATURATION: 96 % | WEIGHT: 187 LBS | HEART RATE: 94 BPM | HEIGHT: 67 IN | SYSTOLIC BLOOD PRESSURE: 98 MMHG

## 2021-08-26 DIAGNOSIS — E78.5 DYSLIPIDEMIA: ICD-10-CM

## 2021-08-26 DIAGNOSIS — Z82.49 FAMILY HISTORY OF HEART DISEASE: ICD-10-CM

## 2021-08-26 DIAGNOSIS — Z12.31 VISIT FOR SCREENING MAMMOGRAM: ICD-10-CM

## 2021-08-26 PROCEDURE — 99214 OFFICE O/P EST MOD 30 MIN: CPT | Performed by: NURSE PRACTITIONER

## 2021-08-26 PROCEDURE — 77063 BREAST TOMOSYNTHESIS BI: CPT

## 2021-08-26 RX ORDER — OXYBUTYNIN CHLORIDE 15 MG/1
TABLET, EXTENDED RELEASE ORAL
COMMUNITY
Start: 2021-06-21 | End: 2021-09-30

## 2021-08-26 RX ORDER — LEVOTHYROXINE SODIUM 137 UG/1
137 TABLET ORAL
COMMUNITY
Start: 2021-07-22

## 2021-08-26 ASSESSMENT — ENCOUNTER SYMPTOMS
CLAUDICATION: 0
PND: 0
MYALGIAS: 1
SHORTNESS OF BREATH: 0
DIZZINESS: 0
ABDOMINAL PAIN: 0
ORTHOPNEA: 0
PALPITATIONS: 0
COUGH: 0
FEVER: 0

## 2021-08-26 ASSESSMENT — FIBROSIS 4 INDEX: FIB4 SCORE: 1.26

## 2021-08-26 NOTE — PATIENT INSTRUCTIONS
Alirocumab injection  What is this medicine?  ALIROCUMAB (al i KHRIS ue mab) is known as a PCSK9 inhibitor. It is used to lower the level of cholesterol in the blood. It may be used alone or in combination with other cholesterol-lowering drugs. This drug may also be used to reduce the risk of heart attack, stroke, and certain types of chest pain (unstable angina) that may need hospitalization.  This medicine may be used for other purposes; ask your health care provider or pharmacist if you have questions.  COMMON BRAND NAME(S): Praluent  What should I tell my health care provider before I take this medicine?  They need to know if you have any of these conditions:  · any unusual or allergic reaction to alirocumab, other medicines, foods, dyes, or preservatives  · pregnant or trying to get pregnant  · breast-feeding  How should I use this medicine?  This medicine is for injection under the skin. You will be taught how to prepare and give this medicine. Use exactly as directed. Take your medicine at regular intervals. Do not take your medicine more often than directed.  It is important that you put your used needles and syringes in a special sharps container. Do not put them in a trash can. If you do not have a sharps container, call your pharmacist or healthcare provider to get one.  Talk to your pediatrician regarding the use of this medicine in children. Special care may be needed.  Overdosage: If you think you have taken too much of this medicine contact a poison control center or emergency room at once.  NOTE: This medicine is only for you. Do not share this medicine with others.  What if I miss a dose?  If you are on an every 2 week schedule and miss a dose, take it as soon as you can. If your next dose is to be taken in less than 7 days, then do not take the missed dose. Take the next dose at your regular time. Do not take double or extra doses. If you are on a monthly schedule and miss a dose, take it as soon as  you can. If the dose given is within 7 days of the missed dose, continue with your regular monthly schedule. If the missed dose is administered after 7 days of the original date, administer the dose and start a new monthly schedule based on this date.  What may interact with this medicine?  Interactions are not expected.  This list may not describe all possible interactions. Give your health care provider a list of all the medicines, herbs, non-prescription drugs, or dietary supplements you use. Also tell them if you smoke, drink alcohol, or use illegal drugs. Some items may interact with your medicine.  What should I watch for while using this medicine?  You may need blood work done while you are taking this medicine.  What side effects may I notice from receiving this medicine?  Side effects that you should report to your doctor or health care professional as soon as possible:  · allergic reactions like skin rash, itching or hives, swelling of the face, lips, or tongue  · signs and symptoms of infection like fever or chills; cough; sore throat; pain or trouble passing urine  · signs and symptoms of liver injury like dark yellow or brown urine; general ill feeling or flu-like symptoms; light-colored stools; loss of appetite; nausea; right upper belly pain; unusually weak or tired; yellowing of the eyes or skin  Side effects that usually do not require medical attention (report to your doctor or health care professional if they continue or are bothersome):  · diarrhea  · muscle cramps  · muscle pain  · pain, redness, or irritation at site where injected  This list may not describe all possible side effects. Call your doctor for medical advice about side effects. You may report side effects to FDA at 0-287-FDA-9552.  Where should I keep my medicine?  Keep out of the reach of children.  You will be instructed on how to store this medicine. Throw away any unused medicine after the expiration date on the label.  NOTE:  This sheet is a summary. It may not cover all possible information. If you have questions about this medicine, talk to your doctor, pharmacist, or health care provider.  © 2020 [employer] (2019-05-02 22:02:52)

## 2021-08-26 NOTE — PROGRESS NOTES
Chief Complaint   Patient presents with   • Dyslipidemia   • Hyperlipidemia       Subjective     Lang Estrella is a 71 y.o. female who presents today for follow up on her Hyperlipidemia.    Patient was referred by her primary care for consideration of lipid management.  Patient reports trying multiple statin medications, but unable to tolerate due to myalgias.  She also was recommended to try ezetimibe, and she developed myalgias with use.    Patient reports her father with history of heart attack and bypass surgery.    For her symptoms, she denies chest pain, palpitations, tachypnea, PND, edema, shortness of breath or dizziness/lightheadedness.    Patient denies a history of hypertension, stroke, heart attack, PVD.    She states on July 1 she started a code red diet where she is not eating grains or sugars.  She has so far lost about 15 pounds.    Reports a history of thyroid disease, GERD, depression/anxiety.    Past Medical History:   Diagnosis Date   • Arthritis     haNDS   • Breast cancer (HCC)    • Bronchitis    • Cancer (HCC) 2000    breast   • Cold 11/2017    sinus infection- on augmentin   • Disorder of thyroid    • Heart burn    • Hiatus hernia syndrome    • Indigestion    • Pneumonia    • Psychiatric problem    • Snoring    • Urinary incontinence      Past Surgical History:   Procedure Laterality Date   • PELVISCOPY  12/29/2017    Procedure: PELVISCOPY;  Surgeon: Haydee River M.D.;  Location: SURGERY SAME DAY [facility];  Service: Gynecology   • LAPAROSCOPIC BILATERAL SALPINGO OOPHERECTOMY Bilateral 12/29/2017    Procedure: LAPAROSCOPIC BILATERAL SALPINGO OOPHERECTOMY;  Surgeon: Haydee River M.D.;  Location: SURGERY SAME DAY [facility];  Service: Gynecology   • SEPTOPLASTY N/A 1/15/2016    Procedure: SEPTOPLASTY;  Surgeon: Randolph Kinney M.D.;  Location: SURGERY SAME DAY [facility];  Service:    • TURBINOPLASTY Bilateral 1/15/2016    Procedure: TURBINOPLASTY;  Surgeon: Randolph STEWART  DENTON Kinney;  Location: SURGERY SAME DAY [facility];  Service:    • MASTECTOMY  2001   • LUMPECTOMY     • OTHER      Breast cancer and breast implants   • OTHER      sinus surgery   • OTHER ORTHOPEDIC SURGERY      bunions alethea   • PB RADIATION THERAPY PLAN SIMPLE       Family History   Problem Relation Age of Onset   • Breast Cancer Mother    • Stroke Mother    • DVT Mother    • Other Mother         TIAs   • Cancer Father         brain- most likely mets   • Heart Disease Father    • Heart Attack Father         Bypass surgery   • Cancer Sister 55        ovarian cancer - BRCA checked for her and was negative   • Diabetes Brother    • Cancer Brother    • Diabetes Maternal Grandfather    • Cancer Sister 59        brain and breast cancer   • Cancer Brother         prostate cancer     Social History     Socioeconomic History   • Marital status:      Spouse name: Not on file   • Number of children: Not on file   • Years of education: Not on file   • Highest education level: Not on file   Occupational History   • Not on file   Tobacco Use   • Smoking status: Never Smoker   • Smokeless tobacco: Never Used   Vaping Use   • Vaping Use: Never used   Substance and Sexual Activity   • Alcohol use: Not Currently     Alcohol/week: 0.6 oz     Types: 1 Glasses of wine per week     Comment: 3/month 6/26/19   • Drug use: No   • Sexual activity: Not Currently     Partners: Male   Other Topics Concern   • Not on file   Social History Narrative   • Not on file     Social Determinants of Health     Financial Resource Strain:    • Difficulty of Paying Living Expenses:    Food Insecurity:    • Worried About Running Out of Food in the Last Year:    • Ran Out of Food in the Last Year:    Transportation Needs:    • Lack of Transportation (Medical):    • Lack of Transportation (Non-Medical):    Physical Activity:    • Days of Exercise per Week:    • Minutes of Exercise per Session:    Stress:    • Feeling of Stress :    Social  Connections:    • Frequency of Communication with Friends and Family:    • Frequency of Social Gatherings with Friends and Family:    • Attends Evangelical Services:    • Active Member of Clubs or Organizations:    • Attends Club or Organization Meetings:    • Marital Status:    Intimate Partner Violence:    • Fear of Current or Ex-Partner:    • Emotionally Abused:    • Physically Abused:    • Sexually Abused:      Allergies   Allergen Reactions   • Levaquin Unspecified     Cramping of legs   • Statins [Hmg-Coa-R Inhibitors]      Looks up her legs     Outpatient Encounter Medications as of 8/26/2021   Medication Sig Dispense Refill   • levothyroxine (SYNTHROID) 137 MCG Tab Take 137 mcg by mouth.     • oxybutynin (DITROPAN XL) 15 MG CR tablet      • liothyronine (CYTOMEL) 5 MCG Tab TAKE 1 TABLET TWICE A  tablet 3   • donepezil (ARICEPT) 5 MG Tab TAKE 1 TABLET DAILY 90 tablet 3   • benzonatate (TESSALON) 100 MG Cap Take 1 Cap by mouth 3 times a day as needed for Cough. 90 Cap 4   • pantoprazole (PROTONIX) 40 MG Tablet Delayed Response Take 1 Tab by mouth every day. 90 Tab 4   • sertraline (ZOLOFT) 50 MG Tab Take 1 Tab by mouth every day. 90 Tab 4   • traZODone (DESYREL) 50 MG Tab TAKE 1 TABLET BY MOUTH AT  BEDTIME AS NEEDED FOR SLEEP 90 Tab 4   • Magnesium 500 MG Tab Take  by mouth.     • valacyclovir (VALTREX) 1 GM Tab Take 1,000 mg by mouth as needed (rash).     • VITAMIN D, CHOLECALCIFEROL, PO Take 5,000 Units by mouth every day.     • VITAMIN E PO Take 1 Tab by mouth every day.     • Misc Natural Products (JOINT SUPPORT PO) Take 2 Tabs by mouth 2 Times a Day.     • Calcium-Magnesium-Vitamin D (CALCIUM 500 PO) Take 1 Tab by mouth 2 Times a Day.     • [DISCONTINUED] levothyroxine (SYNTHROID) 150 MCG Tab Take 1 Tab by mouth every morning. ON A EMPTY STOMACH 90 Tab 3   • [DISCONTINUED] rosuvastatin (CRESTOR) 5 MG Tab Take 1 Tab by mouth every evening. 90 Tab 4   • [DISCONTINUED] RESTASIS 0.05 % ophthalmic  "emulsion      • [DISCONTINUED] oxybutynin (DITROPAN) 5 MG Tab Take 1 Tab by mouth 3 times a day. 90 Tab 2   • Thiamine HCl (VITAMIN B-1 PO) Take 1 Tab by mouth every day.       No facility-administered encounter medications on file as of 8/26/2021.     Review of Systems   Constitutional: Negative for fever and malaise/fatigue.   Respiratory: Negative for cough and shortness of breath.    Cardiovascular: Negative for chest pain, palpitations, orthopnea, claudication, leg swelling and PND.   Gastrointestinal: Negative for abdominal pain.   Musculoskeletal: Positive for myalgias (With statin and ezetimibe use).   Neurological: Negative for dizziness.   All other systems reviewed and are negative.             Objective     BP (!) 98/64 (BP Location: Right arm, Patient Position: Sitting, BP Cuff Size: Adult)   Pulse 94   Ht 1.702 m (5' 7\")   Wt 84.8 kg (187 lb)   SpO2 96%   BMI 29.29 kg/m²     Physical Exam   Constitutional: She is oriented to person, place, and time. She appears well-developed.   HENT:   Head: Normocephalic and atraumatic.   Eyes: Pupils are equal, round, and reactive to light.   Neck: No JVD present.   Cardiovascular: Normal rate, regular rhythm and normal heart sounds.   Pulmonary/Chest: Effort normal and breath sounds normal. No respiratory distress. She has no wheezes. She has no rales.   Abdominal: Soft. Bowel sounds are normal.   Musculoskeletal:         General: Normal range of motion.      Cervical back: Normal range of motion and neck supple.      Right lower leg: No edema.      Left lower leg: No edema.   Neurological: She is alert and oriented to person, place, and time.   Skin: Skin is warm and dry.   Psychiatric: Her behavior is normal.   Vitals reviewed.    Lab Results   Component Value Date/Time    CHOLSTRLTOT 254 (H) 06/19/2021 11:55 AM     (H) 06/19/2021 11:55 AM    HDL 66 06/19/2021 11:55 AM    TRIGLYCERIDE 50 06/19/2021 11:55 AM       Lab Results   Component Value " Date/Time    SODIUM 130 (L) 07/22/2021 01:56 PM    POTASSIUM 4.6 07/22/2021 01:56 PM    CHLORIDE 98 07/22/2021 01:56 PM    CO2 20 07/22/2021 01:56 PM    GLUCOSE 94 07/22/2021 01:56 PM    BUN 16 07/22/2021 01:56 PM    CREATININE 0.90 07/22/2021 01:56 PM    BUNCREATRAT 23 04/09/2019 08:02 AM     Lab Results   Component Value Date/Time    ALKPHOSPHAT 100 (H) 06/19/2021 11:55 AM    ASTSGOT 23 06/19/2021 11:55 AM    ALTSGPT 16 06/19/2021 11:55 AM    TBILIRUBIN 0.5 06/19/2021 11:55 AM                 Assessment & Plan     1. Dyslipidemia  CT-CARDIAC SCORING    Comp Metabolic Panel    Lipid Profile   2. Family history of heart disease  CT-CARDIAC SCORING    Comp Metabolic Panel    Lipid Profile       Medical Decision Making: Today's Assessment/Status/Plan:        Dyslipidemia:  -Discussed with patient's recent weight loss to repeat a lipid panel and CMP after September 16.  If cholesterol panel continues to be elevated, will consider referring patient to the lipid clinic for PCSK9 inhibitor.  Information provided to patient to look over at this time  -Will recommend a CT coronary calcium score for family history of her father with heart disease and bypass surgery  -At this time, recommended continued weight loss, but discussed possibly decreasing fat content in her foods.    FU in clinic in 1 month with labs and CT cardiac score. Sooner if needed.    Patient verbalizes understanding and agrees with the plan of care.     PLEASE NOTE: This Note was created using voice recognition Software. I have made every reasonable attempt to correct obvious errors, but I expect that there are errors of grammar and possibly content that I did not discover before finalizing the note

## 2021-08-28 ENCOUNTER — PATIENT MESSAGE (OUTPATIENT)
Dept: CARDIOLOGY | Facility: MEDICAL CENTER | Age: 71
End: 2021-08-28

## 2021-08-30 NOTE — PATIENT COMMUNICATION
S/W pt, she was highly symptomatic. Had spoken to PCP and personal friend who encouraged food and water and rest, BP rebounded.  Advised patient to stay hydrated and ER precautions provided.

## 2021-09-09 ENCOUNTER — HOSPITAL ENCOUNTER (OUTPATIENT)
Dept: LAB | Facility: MEDICAL CENTER | Age: 71
End: 2021-09-09
Attending: NURSE PRACTITIONER
Payer: MEDICARE

## 2021-09-09 DIAGNOSIS — E78.5 DYSLIPIDEMIA: ICD-10-CM

## 2021-09-09 DIAGNOSIS — Z82.49 FAMILY HISTORY OF HEART DISEASE: ICD-10-CM

## 2021-09-09 LAB
ALBUMIN SERPL BCP-MCNC: 4 G/DL (ref 3.2–4.9)
ALBUMIN/GLOB SERPL: 1.4 G/DL
ALP SERPL-CCNC: 83 U/L (ref 30–99)
ALT SERPL-CCNC: 11 U/L (ref 2–50)
ANION GAP SERPL CALC-SCNC: 14 MMOL/L (ref 7–16)
AST SERPL-CCNC: 13 U/L (ref 12–45)
BILIRUB SERPL-MCNC: 0.2 MG/DL (ref 0.1–1.5)
BUN SERPL-MCNC: 21 MG/DL (ref 8–22)
CALCIUM SERPL-MCNC: 9.7 MG/DL (ref 8.5–10.5)
CHLORIDE SERPL-SCNC: 100 MMOL/L (ref 96–112)
CHOLEST SERPL-MCNC: 264 MG/DL (ref 100–199)
CO2 SERPL-SCNC: 21 MMOL/L (ref 20–33)
CREAT SERPL-MCNC: 1 MG/DL (ref 0.5–1.4)
FASTING STATUS PATIENT QL REPORTED: NORMAL
GLOBULIN SER CALC-MCNC: 2.9 G/DL (ref 1.9–3.5)
GLUCOSE SERPL-MCNC: 94 MG/DL (ref 65–99)
HDLC SERPL-MCNC: 57 MG/DL
LDLC SERPL CALC-MCNC: 192 MG/DL
POTASSIUM SERPL-SCNC: 4.4 MMOL/L (ref 3.6–5.5)
PROT SERPL-MCNC: 6.9 G/DL (ref 6–8.2)
SODIUM SERPL-SCNC: 135 MMOL/L (ref 135–145)
TRIGL SERPL-MCNC: 75 MG/DL (ref 0–149)

## 2021-09-09 PROCEDURE — 80053 COMPREHEN METABOLIC PANEL: CPT

## 2021-09-09 PROCEDURE — 80061 LIPID PANEL: CPT

## 2021-09-09 PROCEDURE — 36415 COLL VENOUS BLD VENIPUNCTURE: CPT

## 2021-09-10 ENCOUNTER — HOSPITAL ENCOUNTER (OUTPATIENT)
Dept: RADIOLOGY | Facility: MEDICAL CENTER | Age: 71
End: 2021-09-10
Attending: NURSE PRACTITIONER
Payer: COMMERCIAL

## 2021-09-10 DIAGNOSIS — E78.5 DYSLIPIDEMIA: ICD-10-CM

## 2021-09-10 DIAGNOSIS — Z82.49 FAMILY HISTORY OF HEART DISEASE: ICD-10-CM

## 2021-09-10 PROCEDURE — 4410556 CT-CARDIAC SCORING (SELF PAY ONLY)

## 2021-09-30 ENCOUNTER — OFFICE VISIT (OUTPATIENT)
Dept: CARDIOLOGY | Facility: MEDICAL CENTER | Age: 71
End: 2021-09-30
Payer: MEDICARE

## 2021-09-30 VITALS
WEIGHT: 184 LBS | DIASTOLIC BLOOD PRESSURE: 60 MMHG | HEART RATE: 86 BPM | OXYGEN SATURATION: 94 % | HEIGHT: 67 IN | SYSTOLIC BLOOD PRESSURE: 94 MMHG | BODY MASS INDEX: 28.88 KG/M2

## 2021-09-30 DIAGNOSIS — Z82.49 FAMILY HISTORY OF HEART DISEASE: ICD-10-CM

## 2021-09-30 DIAGNOSIS — E78.5 DYSLIPIDEMIA: ICD-10-CM

## 2021-09-30 PROCEDURE — 99214 OFFICE O/P EST MOD 30 MIN: CPT | Performed by: NURSE PRACTITIONER

## 2021-09-30 RX ORDER — MIRABEGRON 50 MG/1
1 TABLET, FILM COATED, EXTENDED RELEASE ORAL
COMMUNITY
Start: 2021-09-20 | End: 2021-09-30

## 2021-09-30 RX ORDER — MIRABEGRON 50 MG/1
TABLET, FILM COATED, EXTENDED RELEASE ORAL
COMMUNITY

## 2021-09-30 ASSESSMENT — ENCOUNTER SYMPTOMS
PALPITATIONS: 0
PND: 0
ORTHOPNEA: 0
CLAUDICATION: 0
SHORTNESS OF BREATH: 0
MYALGIAS: 1
ABDOMINAL PAIN: 0
COUGH: 0
DIZZINESS: 0
FEVER: 0

## 2021-09-30 ASSESSMENT — FIBROSIS 4 INDEX: FIB4 SCORE: 0.86

## 2021-09-30 NOTE — PROGRESS NOTES
"Chief Complaint   Patient presents with   • Dyslipidemia       Subjective     Lang Estrella is a 71 y.o. female who presents today for follow up on her Hyperlipidemia.    Patient was last seen in clinic on 8/26/2021.  Patient was recommended to obtain follow-up lipid panel.  She was also sent for a CT coronary calcium score.  Patient currently is trying to lose weight through a program called \"Code Red\".  Asked patient is not eating greens or sugars.    She has not had her cholesterol treated due to myalgias with statins and ezetimibe.    Patient reports her father with history of heart attack and bypass surgery.    For her symptoms, she denies chest pain, palpitations, tachypnea, PND, edema, shortness of breath or dizziness/lightheadedness.    Patient denies a history of hypertension, stroke, heart attack, PVD.    She states on July 1 she started a code red diet where she is not eating grains or sugars.  She has so far lost about 22 pounds.  Current weight is at 175 pounds.    Reports a history of thyroid disease, GERD, depression/anxiety.    Past Medical History:   Diagnosis Date   • Arthritis     haNDS   • Breast cancer (HCC)    • Bronchitis    • Cancer (HCC) 2000    breast   • Cold 11/2017    sinus infection- on augmentin   • Disorder of thyroid    • Heart burn    • Hiatus hernia syndrome    • Indigestion    • Pneumonia    • Psychiatric problem    • Snoring    • Urinary incontinence      Past Surgical History:   Procedure Laterality Date   • PELVISCOPY  12/29/2017    Procedure: PELVISCOPY;  Surgeon: Haydee River M.D.;  Location: SURGERY SAME DAY [facility];  Service: Gynecology   • LAPAROSCOPIC BILATERAL SALPINGO OOPHERECTOMY Bilateral 12/29/2017    Procedure: LAPAROSCOPIC BILATERAL SALPINGO OOPHERECTOMY;  Surgeon: Haydee River M.D.;  Location: SURGERY SAME DAY [facility];  Service: Gynecology   • SEPTOPLASTY N/A 1/15/2016    Procedure: SEPTOPLASTY;  Surgeon: Randolph Kinney M.D.;  Location: " SURGERY SAME DAY [facility];  Service:    • TURBINOPLASTY Bilateral 1/15/2016    Procedure: TURBINOPLASTY;  Surgeon: Randolph Kinney M.D.;  Location: SURGERY SAME DAY [facility];  Service:    • MASTECTOMY  2001   • LUMPECTOMY     • OTHER      Breast cancer and breast implants   • OTHER      sinus surgery   • OTHER ORTHOPEDIC SURGERY      bunions alethea   • PB RADIATION THERAPY PLAN SIMPLE       Family History   Problem Relation Age of Onset   • Breast Cancer Mother    • Stroke Mother    • DVT Mother    • Other Mother         TIAs   • Cancer Father         brain- most likely mets   • Heart Disease Father    • Heart Attack Father         Bypass surgery   • Cancer Sister 55        ovarian cancer - BRCA checked for her and was negative   • Diabetes Brother    • Cancer Brother    • Diabetes Maternal Grandfather    • Cancer Sister 59        brain and breast cancer   • Cancer Brother         prostate cancer     Social History     Socioeconomic History   • Marital status:      Spouse name: Not on file   • Number of children: Not on file   • Years of education: Not on file   • Highest education level: Not on file   Occupational History   • Not on file   Tobacco Use   • Smoking status: Never Smoker   • Smokeless tobacco: Never Used   Vaping Use   • Vaping Use: Never used   Substance and Sexual Activity   • Alcohol use: Not Currently     Alcohol/week: 0.6 oz     Types: 1 Glasses of wine per week     Comment: 3/month 6/26/19   • Drug use: No   • Sexual activity: Not Currently     Partners: Male   Other Topics Concern   • Not on file   Social History Narrative   • Not on file     Social Determinants of Health     Financial Resource Strain:    • Difficulty of Paying Living Expenses:    Food Insecurity:    • Worried About Running Out of Food in the Last Year:    • Ran Out of Food in the Last Year:    Transportation Needs:    • Lack of Transportation (Medical):    • Lack of Transportation (Non-Medical):    Physical  Activity:    • Days of Exercise per Week:    • Minutes of Exercise per Session:    Stress:    • Feeling of Stress :    Social Connections:    • Frequency of Communication with Friends and Family:    • Frequency of Social Gatherings with Friends and Family:    • Attends Hoahaoism Services:    • Active Member of Clubs or Organizations:    • Attends Club or Organization Meetings:    • Marital Status:    Intimate Partner Violence:    • Fear of Current or Ex-Partner:    • Emotionally Abused:    • Physically Abused:    • Sexually Abused:      Allergies   Allergen Reactions   • Levaquin Unspecified     Cramping of legs   • Statins [Hmg-Coa-R Inhibitors]      Looks up her legs     Outpatient Encounter Medications as of 9/30/2021   Medication Sig Dispense Refill   • Mirabegron ER (MYRBETRIQ) 50 MG TABLET SR 24 HR Myrbetriq 50 mg tablet,extended release   Take 1 tablet every day by oral route for 30 days.     • mupirocin (BACTROBAN) 2 % Ointment APPLY OINTMENT TOPICALLY TO AFFECTED AREA TWICE DAILY AS NEEDED FOR 5 DAYS     • levothyroxine (SYNTHROID) 137 MCG Tab Take 137 mcg by mouth.     • liothyronine (CYTOMEL) 5 MCG Tab TAKE 1 TABLET TWICE A  tablet 3   • donepezil (ARICEPT) 5 MG Tab TAKE 1 TABLET DAILY 90 tablet 3   • benzonatate (TESSALON) 100 MG Cap Take 1 Cap by mouth 3 times a day as needed for Cough. 90 Cap 4   • pantoprazole (PROTONIX) 40 MG Tablet Delayed Response Take 1 Tab by mouth every day. 90 Tab 4   • sertraline (ZOLOFT) 50 MG Tab Take 1 Tab by mouth every day. 90 Tab 4   • traZODone (DESYREL) 50 MG Tab TAKE 1 TABLET BY MOUTH AT  BEDTIME AS NEEDED FOR SLEEP 90 Tab 4   • Magnesium 500 MG Tab Take  by mouth.     • valacyclovir (VALTREX) 1 GM Tab Take 1,000 mg by mouth as needed (rash).     • Thiamine HCl (VITAMIN B-1 PO) Take 1 Tab by mouth every day.     • VITAMIN D, CHOLECALCIFEROL, PO Take 5,000 Units by mouth every day.     • VITAMIN E PO Take 1 Tab by mouth every day.     • Misc Natural Products  "(JOINT SUPPORT PO) Take 2 Tabs by mouth 2 Times a Day.     • Calcium-Magnesium-Vitamin D (CALCIUM 500 PO) Take 1 Tab by mouth 2 Times a Day.     • [DISCONTINUED] MYRBETRIQ 50 MG TABLET SR 24 HR Take 1 tablet  by mouth.     • [DISCONTINUED] oxybutynin (DITROPAN XL) 15 MG CR tablet  (Patient not taking: Reported on 9/30/2021)       No facility-administered encounter medications on file as of 9/30/2021.     Review of Systems   Constitutional: Negative for fever and malaise/fatigue.   Respiratory: Negative for cough and shortness of breath.    Cardiovascular: Negative for chest pain, palpitations, orthopnea, claudication, leg swelling and PND.   Gastrointestinal: Negative for abdominal pain.   Musculoskeletal: Positive for myalgias (With statin and ezetimibe use).   Neurological: Negative for dizziness.   All other systems reviewed and are negative.             Objective     BP (!) 94/60 (BP Location: Right arm, Patient Position: Sitting, BP Cuff Size: Adult)   Pulse 86   Ht 1.702 m (5' 7\")   Wt 83.5 kg (184 lb)   SpO2 94%   BMI 28.82 kg/m²     Physical Exam  Vitals reviewed.   Constitutional:       Appearance: She is well-developed.   HENT:      Head: Normocephalic and atraumatic.   Eyes:      Pupils: Pupils are equal, round, and reactive to light.   Neck:      Vascular: No JVD.   Cardiovascular:      Rate and Rhythm: Normal rate and regular rhythm.      Heart sounds: Normal heart sounds.   Pulmonary:      Effort: Pulmonary effort is normal. No respiratory distress.      Breath sounds: Normal breath sounds. No wheezing or rales.   Abdominal:      General: Bowel sounds are normal.      Palpations: Abdomen is soft.   Musculoskeletal:         General: Normal range of motion.      Cervical back: Normal range of motion and neck supple.      Right lower leg: No edema.      Left lower leg: No edema.   Skin:     General: Skin is warm and dry.   Neurological:      General: No focal deficit present.      Mental Status: She " is alert and oriented to person, place, and time.   Psychiatric:         Behavior: Behavior normal.       Lab Results   Component Value Date/Time    CHOLSTRLTOT 264 (H) 09/09/2021 10:57 AM     (H) 09/09/2021 10:57 AM    HDL 57 09/09/2021 10:57 AM    TRIGLYCERIDE 75 09/09/2021 10:57 AM       Lab Results   Component Value Date/Time    SODIUM 135 09/09/2021 10:57 AM    POTASSIUM 4.4 09/09/2021 10:57 AM    CHLORIDE 100 09/09/2021 10:57 AM    CO2 21 09/09/2021 10:57 AM    GLUCOSE 94 09/09/2021 10:57 AM    BUN 21 09/09/2021 10:57 AM    CREATININE 1.00 09/09/2021 10:57 AM    BUNCREATRAT 23 04/09/2019 08:02 AM     Lab Results   Component Value Date/Time    ALKPHOSPHAT 83 09/09/2021 10:57 AM    ASTSGOT 13 09/09/2021 10:57 AM    ALTSGPT 11 09/09/2021 10:57 AM    TBILIRUBIN 0.2 09/09/2021 10:57 AM      CT cardiac score 9/10/2021  FINDINGS:     Coronary calcification:  LMA - 0.0  LCX - 0.0  LAD - 0.0  RCA - 0.0  PDA - 0.0     Total Calcium Score: 0.0     Percentile: Calcium score is below the 25th percentile for the patient's age and sex.     Other findings:  Heart: Normal size. Minimal calcifications aortic valve. Small pericardial fluid collections.  Lungs: Clear.  Mediastinum: Normal.  Upper abdomen: Normal. Air distended distal esophagus.     IMPRESSION:     Coronary Calcium Score of 0     You have no detectable cholesterol (plaque) build-up in the arteries which supply blood flow to your heart. This test cannot rule out completely any cholesterol build-up in your arteries, but these results mean you have a very low chance of having heart   disease, and a low future risk of heart attack and stroke. While this is excellent news, we still recommend a healthy low saturated fat, low sugar diet and regular exercise. Other ways to ensure you do not develop cholesterol build-up in your arteries   are to avoid smoking and maintain and normal weight. We recommend you consider rechecking your score in 5-10 years, which you can  discuss with your doctor. If you are on aspirin or cholesterol medications, you can discuss stopping these with your doctor,   as they may be of minimal benefit for you.     Guidelines based upon the American College of Cardiology 2018 recommendations.         Assessment & Plan     1. Dyslipidemia  Comp Metabolic Panel    Lipid Profile   2. Family history of heart disease  Comp Metabolic Panel    Lipid Profile       Medical Decision Making: Today's Assessment/Status/Plan:        Dyslipidemia:  -ASCVD score is 5.2% for 10-year risk heart disease or stroke  -Discussed results of her coronary CT score which is 0  -Discussed patient's cholesterol level is elevated  -Will hold off on statin at this time or other treatment  -At this time, recommended continued weight loss, but discussed possibly decreasing fat content in her foods at some point. Cholesterol level likely reflective of this .    FU in clinic in 3 months with labs. Sooner if needed.    Patient verbalizes understanding and agrees with the plan of care.     PLEASE NOTE: This Note was created using voice recognition Software. I have made every reasonable attempt to correct obvious errors, but I expect that there are errors of grammar and possibly content that I did not discover before finalizing the note

## 2021-11-11 ENCOUNTER — HOSPITAL ENCOUNTER (OUTPATIENT)
Dept: RADIOLOGY | Facility: MEDICAL CENTER | Age: 71
End: 2021-11-11
Attending: FAMILY MEDICINE
Payer: MEDICARE

## 2021-11-11 DIAGNOSIS — M54.50 LOW BACK PAIN, UNSPECIFIED BACK PAIN LATERALITY, UNSPECIFIED CHRONICITY, UNSPECIFIED WHETHER SCIATICA PRESENT: ICD-10-CM

## 2021-11-11 PROCEDURE — 72100 X-RAY EXAM L-S SPINE 2/3 VWS: CPT

## 2022-01-26 ENCOUNTER — TELEPHONE (OUTPATIENT)
Dept: CARDIOLOGY | Facility: MEDICAL CENTER | Age: 72
End: 2022-01-26

## 2022-01-26 NOTE — TELEPHONE ENCOUNTER
LVM for pt in regards to lab work that was ordered at previous OV with MARILUZ Obrien . Office number given for call back if pt has any questions or has had lab work done outside of [facility]. Pt has follow up appointment scheduled with  MARILUZ Obrien on 01/31/2022.

## 2022-08-31 ENCOUNTER — APPOINTMENT (RX ONLY)
Dept: URBAN - METROPOLITAN AREA CLINIC 22 | Facility: CLINIC | Age: 72
Setting detail: DERMATOLOGY
End: 2022-08-31

## 2022-08-31 DIAGNOSIS — D22 MELANOCYTIC NEVI: ICD-10-CM

## 2022-08-31 DIAGNOSIS — L72.0 EPIDERMAL CYST: ICD-10-CM

## 2022-08-31 DIAGNOSIS — Z71.89 OTHER SPECIFIED COUNSELING: ICD-10-CM

## 2022-08-31 DIAGNOSIS — L81.4 OTHER MELANIN HYPERPIGMENTATION: ICD-10-CM

## 2022-08-31 DIAGNOSIS — L82.1 OTHER SEBORRHEIC KERATOSIS: ICD-10-CM

## 2022-08-31 DIAGNOSIS — L85.3 XEROSIS CUTIS: ICD-10-CM

## 2022-08-31 PROBLEM — D22.5 MELANOCYTIC NEVI OF TRUNK: Status: ACTIVE | Noted: 2022-08-31

## 2022-08-31 PROCEDURE — ? COUNSELING

## 2022-08-31 PROCEDURE — 99213 OFFICE O/P EST LOW 20 MIN: CPT

## 2022-08-31 PROCEDURE — ? ADDITIONAL NOTES

## 2022-08-31 PROCEDURE — ? EXTRACTIONS

## 2022-08-31 PROCEDURE — ? SUNSCREEN TREATMENT REGIMEN

## 2022-08-31 ASSESSMENT — LOCATION DETAILED DESCRIPTION DERM
LOCATION DETAILED: RIGHT VENTRAL PROXIMAL FOREARM
LOCATION DETAILED: INFERIOR THORACIC SPINE
LOCATION DETAILED: RIGHT CENTRAL MALAR CHEEK
LOCATION DETAILED: SUPERIOR THORACIC SPINE
LOCATION DETAILED: INFERIOR MID FOREHEAD
LOCATION DETAILED: LEFT VENTRAL PROXIMAL FOREARM
LOCATION DETAILED: MID TRAPEZIAL NECK

## 2022-08-31 ASSESSMENT — LOCATION ZONE DERM
LOCATION ZONE: TRUNK
LOCATION ZONE: ARM
LOCATION ZONE: NECK
LOCATION ZONE: FACE

## 2022-08-31 ASSESSMENT — LOCATION SIMPLE DESCRIPTION DERM
LOCATION SIMPLE: UPPER BACK
LOCATION SIMPLE: RIGHT CHEEK
LOCATION SIMPLE: TRAPEZIAL NECK
LOCATION SIMPLE: RIGHT FOREARM
LOCATION SIMPLE: INFERIOR FOREHEAD
LOCATION SIMPLE: LEFT FOREARM

## 2022-08-31 NOTE — PROCEDURE: EXTRACTIONS
Acne Type: A comedo
Consent was obtained and risks were reviewed including but not limited to scarring, infection, bleeding, scabbing, incomplete removal, and allergy to anesthesia.
Extraction Method: 11 blade and q-tip
Post-Care Instructions: I reviewed with the patient in detail post-care instructions. Patient is to keep the treatment areas dry overnight, and then apply bacitracin twice daily until healed. Patient may apply hydrogen peroxide soaks to remove any crusting.
Prep Text (Optional): Prior to removal the treatment areas were prepped in the usual fashion with alcohol cleanser
Render Post-Care Instructions In Note?: no
Detail Level: Detailed
Hemostasis: Pressure
Render Number Of Lesions Treated: yes

## 2022-08-31 NOTE — PROCEDURE: ADDITIONAL NOTES
Render Risk Assessment In Note?: no
Additional Notes: Recommend Sarna, otc hydrocortisone
Detail Level: Detailed

## 2022-11-03 ENCOUNTER — PATIENT MESSAGE (OUTPATIENT)
Dept: HEALTH INFORMATION MANAGEMENT | Facility: OTHER | Age: 72
End: 2022-11-03

## 2023-03-16 ENCOUNTER — APPOINTMENT (RX ONLY)
Dept: URBAN - METROPOLITAN AREA CLINIC 22 | Facility: CLINIC | Age: 73
Setting detail: DERMATOLOGY
End: 2023-03-16

## 2023-03-16 DIAGNOSIS — L82.1 OTHER SEBORRHEIC KERATOSIS: ICD-10-CM

## 2023-03-16 DIAGNOSIS — D22 MELANOCYTIC NEVI: ICD-10-CM

## 2023-03-16 DIAGNOSIS — Z71.89 OTHER SPECIFIED COUNSELING: ICD-10-CM

## 2023-03-16 DIAGNOSIS — L72.0 EPIDERMAL CYST: ICD-10-CM

## 2023-03-16 DIAGNOSIS — L81.4 OTHER MELANIN HYPERPIGMENTATION: ICD-10-CM

## 2023-03-16 PROBLEM — D22.5 MELANOCYTIC NEVI OF TRUNK: Status: ACTIVE | Noted: 2023-03-16

## 2023-03-16 PROCEDURE — ? COUNSELING

## 2023-03-16 PROCEDURE — ? ADDITIONAL NOTES

## 2023-03-16 PROCEDURE — ? EXTRACTIONS

## 2023-03-16 PROCEDURE — ? SUNSCREEN TREATMENT REGIMEN

## 2023-03-16 PROCEDURE — 99213 OFFICE O/P EST LOW 20 MIN: CPT

## 2023-03-16 ASSESSMENT — LOCATION DETAILED DESCRIPTION DERM
LOCATION DETAILED: LEFT INFERIOR CENTRAL BUCCAL CHEEK
LOCATION DETAILED: INFERIOR THORACIC SPINE
LOCATION DETAILED: RIGHT VENTRAL PROXIMAL FOREARM
LOCATION DETAILED: SUPERIOR THORACIC SPINE
LOCATION DETAILED: INFERIOR MID FOREHEAD
LOCATION DETAILED: LEFT VENTRAL PROXIMAL FOREARM

## 2023-03-16 ASSESSMENT — LOCATION SIMPLE DESCRIPTION DERM
LOCATION SIMPLE: LEFT CHEEK
LOCATION SIMPLE: LEFT FOREARM
LOCATION SIMPLE: INFERIOR FOREHEAD
LOCATION SIMPLE: UPPER BACK
LOCATION SIMPLE: RIGHT FOREARM

## 2023-03-16 ASSESSMENT — LOCATION ZONE DERM
LOCATION ZONE: FACE
LOCATION ZONE: ARM
LOCATION ZONE: TRUNK

## 2023-03-16 NOTE — PROCEDURE: ADDITIONAL NOTES
Additional Notes: Could not extract anything, Samples of Arazlo given to use nightly and encouraged not to pick at this site
Render Risk Assessment In Note?: no
Detail Level: Detailed

## 2023-03-16 NOTE — PROCEDURE: EXTRACTIONS
Acne Type: A milial cyst
Consent: Verbal consent was obtained and risks were reviewed including but not limited to scarring, infection, bleeding, scabbing, incomplete removal, and allergy to anesthesia.
Extraction Method: 11 blade and q-tip
Post-Care Instructions: I reviewed with the patient in detail post-care instructions. Patient is to keep the treatment areas dry overnight, and then apply bacitracin twice daily until healed. Patient may apply hydrogen peroxide soaks to remove any crusting.
Prep Text (Optional): Prior to removal the treatment areas were prepped in the usual fashion with alcohol cleanser
Render Post-Care Instructions In Note?: no
Detail Level: Detailed
Hemostasis: Pressure
Render Number Of Lesions Treated: yes

## 2024-03-19 ENCOUNTER — APPOINTMENT (RX ONLY)
Dept: URBAN - METROPOLITAN AREA CLINIC 22 | Facility: CLINIC | Age: 74
Setting detail: DERMATOLOGY
End: 2024-03-19

## 2024-03-19 DIAGNOSIS — L81.3 CAFÉ AU LAIT SPOTS: ICD-10-CM

## 2024-03-19 DIAGNOSIS — D22 MELANOCYTIC NEVI: ICD-10-CM

## 2024-03-19 DIAGNOSIS — L82.1 OTHER SEBORRHEIC KERATOSIS: ICD-10-CM

## 2024-03-19 DIAGNOSIS — Z71.89 OTHER SPECIFIED COUNSELING: ICD-10-CM

## 2024-03-19 DIAGNOSIS — L81.4 OTHER MELANIN HYPERPIGMENTATION: ICD-10-CM

## 2024-03-19 PROBLEM — D22.5 MELANOCYTIC NEVI OF TRUNK: Status: ACTIVE | Noted: 2024-03-19

## 2024-03-19 PROCEDURE — ? SUNSCREEN RECOMMENDATIONS

## 2024-03-19 PROCEDURE — ? COUNSELING

## 2024-03-19 PROCEDURE — 99213 OFFICE O/P EST LOW 20 MIN: CPT

## 2024-03-19 ASSESSMENT — LOCATION ZONE DERM
LOCATION ZONE: TRUNK
LOCATION ZONE: LEG
LOCATION ZONE: ARM
LOCATION ZONE: FACE

## 2024-03-19 ASSESSMENT — LOCATION DETAILED DESCRIPTION DERM
LOCATION DETAILED: LEFT VENTRAL PROXIMAL FOREARM
LOCATION DETAILED: SUPERIOR THORACIC SPINE
LOCATION DETAILED: INFERIOR MID FOREHEAD
LOCATION DETAILED: INFERIOR THORACIC SPINE
LOCATION DETAILED: RIGHT VENTRAL PROXIMAL FOREARM
LOCATION DETAILED: RIGHT DISTAL POSTERIOR THIGH
LOCATION DETAILED: UPPER STERNUM
LOCATION DETAILED: LEFT DISTAL POSTERIOR THIGH
LOCATION DETAILED: RIGHT SUPERIOR LATERAL LOWER BACK

## 2024-03-19 ASSESSMENT — LOCATION SIMPLE DESCRIPTION DERM
LOCATION SIMPLE: RIGHT POSTERIOR THIGH
LOCATION SIMPLE: RIGHT FOREARM
LOCATION SIMPLE: LEFT POSTERIOR THIGH
LOCATION SIMPLE: CHEST
LOCATION SIMPLE: INFERIOR FOREHEAD
LOCATION SIMPLE: RIGHT LOWER BACK
LOCATION SIMPLE: LEFT FOREARM
LOCATION SIMPLE: UPPER BACK

## 2024-03-19 NOTE — PROCEDURE: COUNSELING
Called pt and schedule appt
SEE BELOW
Detail Level: Generalized
Detail Level: Zone
Detail Level: Detailed

## 2024-03-19 NOTE — PROCEDURE: SUNSCREEN RECOMMENDATIONS
Products Recommended: Dian Newton \\nElta md UV clear
Detail Level: Zone
General Sunscreen Counseling: I recommended a broad spectrum sunscreen with a SPF of 30 or higher.  I explained that SPF 30 sunscreens block approximately 97 percent of the sun's harmful rays.  Sunscreens should be applied at least 15 minutes prior to expected sun exposure and then every 2 hours after that as long as sun exposure continues. If swimming or exercising sunscreen should be reapplied every 45 minutes to an hour after getting wet or sweating.  One ounce, or the equivalent of a shot glass full of sunscreen, is adequate to protect the skin not covered by a bathing suit. I also recommended a lip balm with a sunscreen as well. Sun protective clothing can be used in lieu of sunscreen but must be worn the entire time you are exposed to the sun's rays.

## 2025-03-18 ENCOUNTER — APPOINTMENT (OUTPATIENT)
Dept: URBAN - METROPOLITAN AREA CLINIC 22 | Facility: CLINIC | Age: 75
Setting detail: DERMATOLOGY
End: 2025-03-18

## 2025-03-18 DIAGNOSIS — L81.4 OTHER MELANIN HYPERPIGMENTATION: ICD-10-CM

## 2025-03-18 DIAGNOSIS — M25.70 OSTEOPHYTE, UNSPECIFIED JOINT: ICD-10-CM

## 2025-03-18 DIAGNOSIS — D22 MELANOCYTIC NEVI: ICD-10-CM

## 2025-03-18 DIAGNOSIS — L82.1 OTHER SEBORRHEIC KERATOSIS: ICD-10-CM

## 2025-03-18 DIAGNOSIS — L81.3 CAFÉ AU LAIT SPOTS: ICD-10-CM

## 2025-03-18 DIAGNOSIS — Z71.89 OTHER SPECIFIED COUNSELING: ICD-10-CM

## 2025-03-18 PROBLEM — D22.5 MELANOCYTIC NEVI OF TRUNK: Status: ACTIVE | Noted: 2025-03-18

## 2025-03-18 PROCEDURE — ? COUNSELING

## 2025-03-18 PROCEDURE — ? SUNSCREEN RECOMMENDATIONS

## 2025-03-18 PROCEDURE — 99213 OFFICE O/P EST LOW 20 MIN: CPT

## 2025-03-18 ASSESSMENT — LOCATION ZONE DERM
LOCATION ZONE: ARM
LOCATION ZONE: LEG
LOCATION ZONE: TRUNK
LOCATION ZONE: SCALP
LOCATION ZONE: FACE

## 2025-03-18 ASSESSMENT — LOCATION DETAILED DESCRIPTION DERM
LOCATION DETAILED: RIGHT INFERIOR OCCIPITAL SCALP
LOCATION DETAILED: LEFT DISTAL POSTERIOR THIGH
LOCATION DETAILED: SUPERIOR THORACIC SPINE
LOCATION DETAILED: UPPER STERNUM
LOCATION DETAILED: INFERIOR MID FOREHEAD
LOCATION DETAILED: RIGHT DISTAL POSTERIOR THIGH
LOCATION DETAILED: RIGHT SUPERIOR LATERAL LOWER BACK
LOCATION DETAILED: RIGHT VENTRAL PROXIMAL FOREARM
LOCATION DETAILED: LEFT VENTRAL PROXIMAL FOREARM
LOCATION DETAILED: INFERIOR THORACIC SPINE

## 2025-03-18 ASSESSMENT — LOCATION SIMPLE DESCRIPTION DERM
LOCATION SIMPLE: CHEST
LOCATION SIMPLE: RIGHT LOWER BACK
LOCATION SIMPLE: INFERIOR FOREHEAD
LOCATION SIMPLE: RIGHT POSTERIOR THIGH
LOCATION SIMPLE: RIGHT FOREARM
LOCATION SIMPLE: LEFT POSTERIOR THIGH
LOCATION SIMPLE: UPPER BACK
LOCATION SIMPLE: LEFT FOREARM
LOCATION SIMPLE: POSTERIOR SCALP

## (undated) DEVICE — SENSOR SPO2 NEO LNCS ADHESIVE (20/BX) SEE USER NOTES

## (undated) DEVICE — SUTURE GENERAL

## (undated) DEVICE — HEAD HOLDER JUNIOR/ADULT

## (undated) DEVICE — SUTURE 0 VICRYL PLUS CT-2 - 27 INCH (36/BX)

## (undated) DEVICE — TUBE CONNECTING SUCTION - CLEAR PLASTIC STERILE 72 IN (50EA/CA)

## (undated) DEVICE — TUBING SETDISPOS HIGH FLOW II - (10/BX)

## (undated) DEVICE — LACTATED RINGERS INJ 1000 ML - (14EA/CA 60CA/PF)

## (undated) DEVICE — CANISTER SUCTION 3000ML MECHANICAL FILTER AUTO SHUTOFF MEDI-VAC NONSTERILE LF DISP  (40EA/CA)

## (undated) DEVICE — GLOVE BIOGEL SZ 6.5 SURGICAL PF LTX (50PR/BX 4BX/CA)

## (undated) DEVICE — SET SUCTION/IRRIGATION WITH DISPOSABLE TIP (6/CA )PART #0250-070-520 IS A SUB

## (undated) DEVICE — GOWN SURGEONS X-LARGE - DISP. (30/CA)

## (undated) DEVICE — SPONGE GAUZESTER. 2X2 4-PL - (2/PK 50PK/BX 30BX/CS)

## (undated) DEVICE — CANISTER SUCTION RIGID RED 1500CC (40EA/CA)

## (undated) DEVICE — ARMREST CRADLE FOAM - (2PR/PK 12PR/CA)

## (undated) DEVICE — ELECTRODE DUAL RETURN W/ CORD - (50/PK)

## (undated) DEVICE — WATER IRRIGATION STERILE 1000ML (12EA/CA)

## (undated) DEVICE — GLOVE BIOGEL PI INDICATOR SZ 6.5 SURGICAL PF LF - (50/BX 4BX/CA)

## (undated) DEVICE — SUCTION INSTRUMENT YANKAUER BULBOUS TIP W/O VENT (50EA/CA)

## (undated) DEVICE — GOWN WARMING STANDARD FLEX - (30/CA)

## (undated) DEVICE — SET LEADWIRE 5 LEAD BEDSIDE DISPOSABLE ECG (1SET OF 5/EA)

## (undated) DEVICE — PACK LAPAROSCOPY - (1/CA)

## (undated) DEVICE — MANIFOLD NEPTUNE 1 PORT (20/PK)

## (undated) DEVICE — DRESSING TRANSPARENT FILM TEGADERM 2.375 X 2.75"  (100EA/BX)"

## (undated) DEVICE — TROCAR5X55 KII SHIELDED SYS - (6/BX)

## (undated) DEVICE — SODIUM CHL IRRIGATION 0.9% 1000ML (12EA/CA)

## (undated) DEVICE — TRAY FOLEY CATHETER STATLOCK 16FR SURESTEP  (10EA/CA)

## (undated) DEVICE — NEEDLE INSFL 120MM 14GA VRRS - (20/BX)

## (undated) DEVICE — TROCAR Z THREAD 11 X 100 - BLADED (6/BX)

## (undated) DEVICE — CATHETER IV 20 GA X 1-1/4 ---SURG.& SDS ONLY--- (50EA/BX)

## (undated) DEVICE — SUTURE 4-0 VICRYL PLUS FS-2 - 27 INCH (36/BX)

## (undated) DEVICE — GLOVE SZ 6.5 BIOGEL PI MICRO - PF LF (50PR/BX)

## (undated) DEVICE — TRAY SRGPRP PVP IOD WT PRP - (20/CA)

## (undated) DEVICE — TUBING CLEARLINK DUO-VENT - C-FLO (48EA/CA)

## (undated) DEVICE — GLOVE BIOGEL SZ 7 SURGICAL PF LTX - (50PR/BX 4BX/CA)

## (undated) DEVICE — TUBE E-T HI-LO CUFF 7.0MM (10EA/PK)

## (undated) DEVICE — KIT  I.V. START (100EA/CA)

## (undated) DEVICE — MASK ANESTHESIA ADULT  - (100/CA)

## (undated) DEVICE — PAD SANITARY 11IN MAXI IND WRAPPED  (12EA/PK 24PK/CA)

## (undated) DEVICE — ELECTRODE 850 FOAM ADHESIVE - HYDROGEL RADIOTRNSPRNT (50/PK)

## (undated) DEVICE — SLEEVE, VASO, THIGH, MED

## (undated) DEVICE — KIT ANESTHESIA W/CIRCUIT & 3/LT BAG W/FILTER (20EA/CA)